# Patient Record
Sex: MALE | Race: WHITE | NOT HISPANIC OR LATINO | Employment: OTHER | URBAN - METROPOLITAN AREA
[De-identification: names, ages, dates, MRNs, and addresses within clinical notes are randomized per-mention and may not be internally consistent; named-entity substitution may affect disease eponyms.]

---

## 2018-08-24 ENCOUNTER — HOSPITAL ENCOUNTER (OUTPATIENT)
Dept: RADIOLOGY | Facility: MEDICAL CENTER | Age: 58
End: 2018-08-24
Attending: FAMILY MEDICINE
Payer: COMMERCIAL

## 2018-08-24 DIAGNOSIS — R91.8 ABNORMAL FINDINGS ON DIAGNOSTIC IMAGING OF LUNG: ICD-10-CM

## 2018-08-24 PROCEDURE — 71046 X-RAY EXAM CHEST 2 VIEWS: CPT

## 2018-09-14 ENCOUNTER — OFFICE VISIT (OUTPATIENT)
Dept: OTHER | Facility: IMAGING CENTER | Age: 58
End: 2018-09-14
Payer: COMMERCIAL

## 2018-09-14 VITALS
HEIGHT: 69 IN | TEMPERATURE: 98.2 F | OXYGEN SATURATION: 95 % | DIASTOLIC BLOOD PRESSURE: 80 MMHG | BODY MASS INDEX: 25.03 KG/M2 | HEART RATE: 67 BPM | RESPIRATION RATE: 14 BRPM | WEIGHT: 169 LBS | SYSTOLIC BLOOD PRESSURE: 148 MMHG

## 2018-09-14 DIAGNOSIS — M62.830 BACK MUSCLE SPASM: ICD-10-CM

## 2018-09-14 DIAGNOSIS — R93.89 ABNORMAL CXR: ICD-10-CM

## 2018-09-14 DIAGNOSIS — R09.89 HYPERINFLATION OF LUNGS: ICD-10-CM

## 2018-09-14 DIAGNOSIS — K44.9 HIATAL HERNIA: ICD-10-CM

## 2018-09-14 DIAGNOSIS — Z80.8 FAMILY HISTORY OF MELANOMA: ICD-10-CM

## 2018-09-14 DIAGNOSIS — R03.0 WHITE COAT SYNDROME WITHOUT DIAGNOSIS OF HYPERTENSION: ICD-10-CM

## 2018-09-14 DIAGNOSIS — K21.9 GASTROESOPHAGEAL REFLUX DISEASE WITHOUT ESOPHAGITIS: ICD-10-CM

## 2018-09-14 DIAGNOSIS — Z82.49 FAMILY HISTORY OF CARDIOVASCULAR DISEASE: ICD-10-CM

## 2018-09-14 PROCEDURE — 99204 OFFICE O/P NEW MOD 45 MIN: CPT | Performed by: FAMILY MEDICINE

## 2018-09-14 RX ORDER — CYCLOBENZAPRINE HCL 5 MG
5-10 TABLET ORAL 3 TIMES DAILY PRN
COMMUNITY
End: 2018-12-12 | Stop reason: SDUPTHER

## 2018-09-14 RX ORDER — OMEPRAZOLE 20 MG/1
CAPSULE, DELAYED RELEASE ORAL
COMMUNITY
Start: 2018-09-02 | End: 2018-11-01 | Stop reason: SDUPTHER

## 2018-09-14 ASSESSMENT — PAIN SCALES - GENERAL: PAINLEVEL: NO PAIN

## 2018-09-14 ASSESSMENT — PATIENT HEALTH QUESTIONNAIRE - PHQ9: CLINICAL INTERPRETATION OF PHQ2 SCORE: 0

## 2018-09-14 NOTE — PROGRESS NOTES
Chief Complaint   Patient presents with   • Gastrophageal Reflux   • Referral Needed     gi, last egd 2008       HPI:  58 y.o. male new patient here to review medical issues and establish care.   Previously with a primary care physician in [place].  [place].  He is planning on going on a trip next year to [facility], will need form signed.      GERD/hiatal hernia- had endoscopy last 2008.  He has been otherwise stable on omeprazole 20 mg daily which she has tolerated well.  He did discuss with his previous primary care physician about possibly having another endoscopy completed.  He initially developed symptoms in his 40s.  He did try to go off medication therapy but about 2 days later had symptoms at night.  He has been on the omeprazole for many years.  Denies being overweight in the past.  Has not had a hiatal hernia repair.    Hyperinflation of lungs-patient had a prior chest x-ray which noted some mild hyperinflation of the lungs previously and had a recent repeat chest x-ray done which showed similar findings.  He had discussed possibly having a pulmonary function test completed.  He denies any history of smoking or exposure to secondhand smoke.  Denies any significant issues with shortness of breath or chest pains with exercise activities.  Denies any history of asthma.  He does not notice any symptoms. 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    Spoke with a high altitude specialist previously as well.   Last fall was at the [facility] camp and had pulmonary issues coming back. Post [facility] trip when he was sick was put on prednisone, albuterol and antibiotics. Had CXR done, was done at [facility]. [facility]. No follow up otherwise.   He had a follow up CXR recently with his PCP which again noted mild hyperinflammation.   No SOB and is physically active.    Exposure to many things due to work history.  Nonsmoker.  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 Last 10 years was in investigations. 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 Had more muscle mass at that time.     He is planning on going on a trip to [place] [facility] next year and will be going to an elevation of 19,000 feet.  He will need medical clearance for this trip.   He discussed possibly completing an exercise treadmill test as well.  However he denies any significant chest pains or shortness of breath with activities.  He exercises 7 days a week currently.    Whitecoat hypertension-states that his blood pressure spikes when in the office.  He does have a log of his blood pressures which he monitors at home.  They have been mostly in the 120-130s/ 70s. Elevated BP in office.     Back muscle spasms -may have occasional back spasms possibly about once a year for which he uses Flexeril.  States that he was told that Flexeril would not be good to use in high altitudes.    States he trained for a trip and when he got home his vitamin B12 was in the low normal range. Taking liquid B 12 supplements. Better energy with vitamin B 12 supplement.    Hx of phlebectomy- bilateral lower leg veins. No swelling or pain.   Right side- last May was skiing and ruptured his plantaris tendon and was advised he did not need to have it repaired. No issues. Has been stable.   Family hx of melanoma- mother. Has seen dermatology in past last about 2 years ago.   Family history of cardiovascular disease.       Health Maintenance  Immunizations: Tdap -August 9, 2018- [facility] - [place].   Colonoscopy: age 51 - repeat in 10 years. Endoscopy- last in 2008, consider repeat in near future.   PSA: consider future lab.     Lifestyle:  Diet: healthy, puts honey in his coffee. Eats some pasta. Has been drinking more water now.   Supplements: Vitamin B12  Exercise: 7 days a week of exercise.  "  Activities: skis, climbs  Stress: \"fairly stress free currently\"  Social Support:  to [place] over 25 years, close friends  Work: retired, law enforcement      Review of Systems   Constitutional: Negative for fever, chills and malaise/fatigue.   HENT: Negative for congestion.    Eyes: Negative for pain or vision changes.   Respiratory: Negative for cough and shortness of breath.    Cardiovascular: Negative for leg swelling. No chest pain.   Gastrointestinal: Negative for nausea, vomiting, abdominal pain and diarrhea.   Genitourinary: Negative for dysuria and hematuria.   Skin: Negative for rash.   Neurological: Negative for dizziness, focal weakness and headaches.   Endo/Heme/Allergies: Does not bruise/bleed easily.   Psychiatric/Behavioral: Negative for depression.  The patient is not nervous/anxious.          Current Outpatient Prescriptions:   •  omeprazole (PRILOSEC) 20 MG delayed-release capsule, , Disp: , Rfl:   •  cyclobenzaprine (FLEXERIL) 5 MG tablet, Take 5-10 mg by mouth 3 times a day as needed., Disp: , Rfl:     No Known Allergies    Past Medical History:   Diagnosis Date   • Back spasm     occurs about once a year, flexeril as needed.    • GERD (gastroesophageal reflux disease)    • Hiatal hernia     endoscopy   • Rupture of right plantaris tendon        Past Surgical History:   Procedure Laterality Date   • PHLEBECTOMY Bilateral 2009   • OTHER  2006    L5-S1 surgery for herniated disc   • INGUINAL HERNIA REPAIR Right 2004   • SHOULDER ARTHROSCOPY Right 1994       Family History   Problem Relation Age of Onset   • Cancer Mother         Melanoma 40s   • Other Father 55        primary amyloidosis- not familial type   • Cancer Maternal Grandmother         colon cancer- around 80s   • Heart Attack Maternal Grandfather         around 60s   • ADD / ADHD Maternal Grandfather    • Diabetes Paternal Grandmother         lives to 80s       Social History     Social History   • Marital status: Unknown     " "Spouse name: N/A   • Number of children: N/A   • Years of education: N/A     Occupational History   • Not on file.     Social History Main Topics   • Smoking status: Never Smoker   • Smokeless tobacco: Never Used   • Alcohol use 1.2 oz/week     1 Glasses of wine, 1 Cans of beer per week   • Drug use: No   • Sexual activity: Yes     Partners: Female     Other Topics Concern   • Not on file     Social History Narrative   • No narrative on file         PHYSICAL EXAM:  /80   Pulse 67   Temp 36.8 °C (98.2 °F)   Resp 14   Ht 1.753 m (5' 9\")   Wt 76.7 kg (169 lb)   SpO2 95%   BMI 24.96 kg/m²   Constitutional: He appears well-developed and well-nourished. He appears not diaphoretic. No distress.   HENT: Right Ear: External ear normal. Left Ear: External ear normal. Tympanic membranes clear and intact.   Nose: Nose normal.   Mouth/Throat: Oropharynx is clear and moist. No oropharyngeal exudate.     Eyes: Conjunctivae and extraocular motions are normal. Pupils are equal, round, and reactive to light. No scleral icterus.   Neck: Normal range of motion. Neck supple. No thyromegaly present.   Cardiovascular: Normal rate, regular rhythm, normal heart sounds and intact distal pulses.  Exam reveals no gallop and no friction rub.  No murmur heard. No carotid bruits.   Pulmonary/Chest: minimally prolonged expiration throughout. No respiratory distress. No wheezes. No rales.   Abdominal: Soft. Bowel sounds are normal. He exhibits no distension and no mass. No tenderness. No rebound and no guarding.   Lymphadenopathy:  He has no cervical adenopathy. No inguinal adenopathy.   Neurological:He is alert. He has normal reflexes. No cranial nerve deficit. He exhibits normal muscle tone.   Skin: Skin is warm and dry. No rash noted. He is not diaphoretic. No erythema.   Psychiatric: He has a normal mood and affect. His behavior is normal.   Musculoskeletal: He exhibits no edema. Difficult to illicit achilles reflexes, otherwise 2+ " DTR throughout. Normal strength throughout.         Narrative       8/24/2018 8:44 AM    HISTORY/REASON FOR EXAM:  Hyperinflation      TECHNIQUE/EXAM DESCRIPTION AND NUMBER OF VIEWS:  Two views of the chest.    COMPARISON:  None available.    FINDINGS:      The mediastinal and cardiac silhouette is unremarkable.    The pulmonary vascularity is within normal limits.    The lung parenchyma is clear. Lungs are mildly hyperinflated.    There is no significant pleural effusion.    There is no visible pneumothorax.    There are no acute bony abnormalities.   Impression       1.  Lungs are mildly hyperinflated. Exam is otherwise unremarkable.     Last labs:see media.  8/24/18 CMP- glucose 99, Lipid- total chol 212, , HDL 52, , HgbA1c 6.0, vitamin B 12 level -902    EKG: see media, Sinus, borderline 1st degree block, HR 51      ASSESSMENT/PLAN:    This is a 58 y.o. Male new patient.     1. Gastroesophageal reflux disease without esophagitis- last EGD 2008, stable. Long term history of symptoms.   -Continue omeprazole 20 mg and modified diet. Referral to GI for possible repeat EGD.  REFERRAL TO GASTROENTEROLOGY   2. Hiatal hernia - stable. As above.  REFERRAL TO GASTROENTEROLOGY   3. Abnormal CXR- as below.   PULMONARY FUNCTION TESTS Test requested: Complete Pulmonary Function Test  CARDIAC STRESS TEST TREADMILL ONLY   4. Hyperinflation of lungs- noted on CXR. Minimal findings on exam. Obtain PFT.  CARDIAC STRESS TEST TREADMILL ONLY  PULMONARY FUNCTION TESTS Test requested: Complete Pulmonary Function Test   5. White coat syndrome without diagnosis of hypertension- elevated BP in office, record of home BP appears within acceptable range.   -Continue to monitor BP routinely. Recommend compare cuff with office measurement. Recommend low sodium diet, routine exercise. Will continue to monitor.      6. Back muscle spasm- intermittent issues. Stable.   -Flexeril as needed. Consider physical therapy and acupuncture  therapy.     7. Family history of melanoma  -Recommend routine dermatology follow up for skin check.   REFERRAL TO DERMATOLOGY   8. Family history of cardiovascular disease- recommend goal LDL < 130 at this time.   -Recommend low cholesterol, high fiber diet. Monitor labs.   CARDIAC STRESS TEST TREADMILL ONLY     Return in about 1 month (around 10/14/2018). Follow up after tests are complete.     This medical record contains text that has been entered with the assistance of computer voice recognition and dictation software.  Therefore, it may contain unintended errors in text, spelling, punctuation, or grammar.

## 2018-09-17 PROBLEM — M62.830 BACK MUSCLE SPASM: Status: ACTIVE | Noted: 2018-09-17

## 2018-09-17 PROBLEM — R03.0 WHITE COAT SYNDROME WITHOUT DIAGNOSIS OF HYPERTENSION: Status: ACTIVE | Noted: 2018-09-17

## 2018-09-20 ENCOUNTER — NON-PROVIDER VISIT (OUTPATIENT)
Dept: CARDIOLOGY | Facility: MEDICAL CENTER | Age: 58
End: 2018-09-20
Attending: FAMILY MEDICINE
Payer: COMMERCIAL

## 2018-09-20 VITALS
BODY MASS INDEX: 25.03 KG/M2 | OXYGEN SATURATION: 98 % | HEIGHT: 69 IN | DIASTOLIC BLOOD PRESSURE: 83 MMHG | SYSTOLIC BLOOD PRESSURE: 153 MMHG | HEART RATE: 72 BPM | WEIGHT: 169 LBS

## 2018-09-20 DIAGNOSIS — Z82.49 FAMILY HISTORY OF ISCHEMIC HEART DISEASE: ICD-10-CM

## 2018-09-20 LAB — TREADMILL STRESS: NORMAL

## 2018-09-20 PROCEDURE — 93015 CV STRESS TEST SUPVJ I&R: CPT | Performed by: INTERNAL MEDICINE

## 2018-09-21 ENCOUNTER — HOSPITAL ENCOUNTER (OUTPATIENT)
Dept: PULMONOLOGY | Facility: MEDICAL CENTER | Age: 58
End: 2018-09-21
Attending: FAMILY MEDICINE
Payer: COMMERCIAL

## 2018-09-21 DIAGNOSIS — R09.89 HYPERINFLATION OF LUNGS: ICD-10-CM

## 2018-09-21 DIAGNOSIS — R93.89 ABNORMAL CXR: ICD-10-CM

## 2018-09-21 PROCEDURE — 94060 EVALUATION OF WHEEZING: CPT

## 2018-09-21 PROCEDURE — 94060 EVALUATION OF WHEEZING: CPT | Mod: 26 | Performed by: INTERNAL MEDICINE

## 2018-09-21 PROCEDURE — 94726 PLETHYSMOGRAPHY LUNG VOLUMES: CPT | Mod: 26 | Performed by: INTERNAL MEDICINE

## 2018-09-21 PROCEDURE — 94726 PLETHYSMOGRAPHY LUNG VOLUMES: CPT

## 2018-09-21 PROCEDURE — 94729 DIFFUSING CAPACITY: CPT | Mod: 26 | Performed by: INTERNAL MEDICINE

## 2018-09-21 PROCEDURE — 94729 DIFFUSING CAPACITY: CPT

## 2018-09-21 ASSESSMENT — PULMONARY FUNCTION TESTS
FEV1/FVC_PERCENT_PREDICTED: 104
FVC: 4.85
FEV1_PREDICTED: 3.57
FEV1/FVC_PERCENT_CHANGE: 67
FVC_PREDICTED: 4.58
FVC_PERCENT_PREDICTED: 105
FEV1_PERCENT_CHANGE: 3
FEV1/FVC_PERCENT_PREDICTED: 78
FEV1_LLN: 2.98
FEV1/FVC_PERCENT_PREDICTED: 104
FEV1_PERCENT_PREDICTED: 110
FEV1/FVC_PERCENT_PREDICTED: 105
FEV1_PERCENT_PREDICTED: 107
FVC_LLN: 3.82
FEV1: 3.84
FEV1/FVC_PERCENT_LLN: 65
FEV1/FVC: 82
FVC: 4.7
FEV1/FVC: 81.44
FEV1/FVC_PREDICTED: 78
FEV1: 3.95
FEV1/FVC_PERCENT_CHANGE: 0
FEV1/FVC_PERCENT_PREDICTED: 105
FEV1_LLN: 2.98
FEV1/FVC: 81
FEV1/FVC: 82
FVC_PERCENT_PREDICTED: 102
FEV1/FVC_PERCENT_LLN: 65
FVC_LLN: 3.82
FEV1_PERCENT_CHANGE: 2

## 2018-09-28 NOTE — PROCEDURES
PULMONARY FUNCTION TEST INTERPRETATION    DATE OF STUDY:  09/21/2018    REQUESTING PROVIDER:  Ermelinda Cotter MD    REASON FOR REQUEST:  Abnormal x-ray.    FINDINGS:  1.  Acceptable and reproducible.  2.  FEV1 is 3.84 liters (107%), FVC is 4.7 liters (102%), ratio 82%.  3.  Flow volume loops normal appearing.  4.  TLC is 7.58 liters (112%).  5.  DLCO corrected for hemoglobin of 26.18 mL/min/mmHg (104%).    IMPRESSION:  Normal spirometry with no response to bronchodilator.  Mild air   trapping, but no hyperinflation.  Gas transfer is normal.  This could be   related to underlying reactive airway disease, but otherwise relatively close   to normal pulmonary function testing.       ____________________________________     MD GUCCI Iglesias / NTS    DD:  09/27/2018 15:55:14  DT:  09/27/2018 16:13:55    D#:  [id number]  Job#:  [id number]    cc: ERMELINDA COTTER MD

## 2018-10-02 ENCOUNTER — PATIENT MESSAGE (OUTPATIENT)
Dept: OTHER | Facility: IMAGING CENTER | Age: 58
End: 2018-10-02

## 2018-10-05 ENCOUNTER — TELEPHONE (OUTPATIENT)
Dept: OTHER | Facility: IMAGING CENTER | Age: 58
End: 2018-10-05

## 2018-10-05 ENCOUNTER — OFFICE VISIT (OUTPATIENT)
Dept: OTHER | Facility: IMAGING CENTER | Age: 58
End: 2018-10-05
Payer: COMMERCIAL

## 2018-10-05 VITALS
OXYGEN SATURATION: 95 % | SYSTOLIC BLOOD PRESSURE: 150 MMHG | TEMPERATURE: 97.6 F | HEIGHT: 69 IN | DIASTOLIC BLOOD PRESSURE: 78 MMHG | BODY MASS INDEX: 25.03 KG/M2 | HEART RATE: 70 BPM | WEIGHT: 169 LBS | RESPIRATION RATE: 14 BRPM

## 2018-10-05 DIAGNOSIS — Z23 NEED FOR SHINGLES VACCINE: ICD-10-CM

## 2018-10-05 DIAGNOSIS — K21.9 GASTROESOPHAGEAL REFLUX DISEASE WITHOUT ESOPHAGITIS: ICD-10-CM

## 2018-10-05 DIAGNOSIS — R09.89 HYPERINFLATION OF LUNGS: ICD-10-CM

## 2018-10-05 DIAGNOSIS — Z82.49 FAMILY HISTORY OF CARDIOVASCULAR DISEASE: ICD-10-CM

## 2018-10-05 DIAGNOSIS — R03.0 WHITE COAT SYNDROME WITHOUT DIAGNOSIS OF HYPERTENSION: ICD-10-CM

## 2018-10-05 PROCEDURE — 99213 OFFICE O/P EST LOW 20 MIN: CPT | Mod: 25 | Performed by: FAMILY MEDICINE

## 2018-10-05 PROCEDURE — 90471 IMMUNIZATION ADMIN: CPT | Performed by: FAMILY MEDICINE

## 2018-10-05 PROCEDURE — 90750 HZV VACC RECOMBINANT IM: CPT | Performed by: FAMILY MEDICINE

## 2018-10-05 NOTE — PROGRESS NOTES
SUBJECTIVE:        Chief Complaint   Patient presents with   • Immunizations     shingles   • Results     pulmonary function test results review       HPI:     Mauricio Mac is a 58 y.o. male here for review of PFT results and for shingles vaccine.   He is active and up at 4 am for his work out.   States when he went on his prior trip last year to [facility], [place] was dia and the trail has animal droppings as well. People develop the [place] cough as they are going up in altitude. States about 3-4 days into his trek he had GI issues and took immodium at a high dose. By the end of the trip his stomach was fine but had lost some weight. He was on diamox as well.   After coming home, his cough had continued 1.5 weeks later, thus was recommended to be seen at urgent care. Where he was xrays and prescribed and inhaler, prednisone, and antibiotic. He improved, but chest xray note mild hyperinflation and thus had repeat recently. He denies having any issues with activities. Denies chest pain, shortness of breath or wheezing. He was an avid  in the past, last went in 2009. Prior history of URI about once a year without any significant major issues.     GERD- GI- saw, reviewed options of therapy. Omeprazole 20 mg daily.   Discussed possible scope last 2006.    White coat hypertension- monitors BP at home, levels are normal. Tends to be elevated in the office.   Family history of CVD- had recent stress test.       ROS:  Denies any recent fevers or chills. No nausea or vomiting. No diarrhea. No chest pains or shortness of breath. No lower extremity edema.    Current Outpatient Prescriptions on File Prior to Visit   Medication Sig Dispense Refill   • omeprazole (PRILOSEC) 20 MG delayed-release capsule      • cyclobenzaprine (FLEXERIL) 5 MG tablet Take 5-10 mg by mouth 3 times a day as needed.       No current facility-administered medications on file prior to visit.        No Known  "Allergies    Patient Active Problem List    Diagnosis Date Noted   • White coat syndrome without diagnosis of hypertension 09/17/2018   • Back muscle spasm 09/17/2018   • Abnormal CXR 09/14/2018   • Hyperinflation of lungs 09/14/2018   • Gastroesophageal reflux disease without esophagitis 09/14/2018   • Hiatal hernia 09/14/2018   • Family history of cardiovascular disease 09/14/2018   • Family history of melanoma 09/14/2018       Past Medical History:   Diagnosis Date   • Back spasm     occurs about once a year, flexeril as needed.    • GERD (gastroesophageal reflux disease)    • Hiatal hernia     endoscopy   • Rupture of right plantaris tendon        OBJECTIVE:   /78   Pulse 70   Temp 36.4 °C (97.6 °F)   Resp 14   Ht 1.753 m (5' 9\")   Wt 76.7 kg (169 lb)   SpO2 95%   BMI 24.96 kg/m²   General: Well-developed well-nourished male, no acute distress  Neck: supple, no lymphadenopathy- cervical or supraclavicular, no thyromegaly  Cardiovascular: regular rate and rhythm, no murmurs, gallops, rubs  Lungs: clear to auscultation bilaterally, no wheezes, crackles, or rhonchi  Abdomen: +bowel sounds, soft, nontender, nondistended, no rebound, no guarding, no hepatosplenomegaly  Extremities: no cyanosis, clubbing, edema  Skin: Warm and dry  Psych: appropriate mood and affect    9/21/18  FINDINGS:  1.  Acceptable and reproducible.  2.  FEV1 is 3.84 liters (107%), FVC is 4.7 liters (102%), ratio 82%.  3.  Flow volume loops normal appearing.  4.  TLC is 7.58 liters (112%).  5.  DLCO corrected for hemoglobin of 26.18 mL/min/mmHg (104%).     IMPRESSION:  Normal spirometry with no response to bronchodilator.  Mild air   trapping, but no hyperinflation.  Gas transfer is normal.  This could be   related to underlying reactive airway disease, but otherwise relatively close   to normal pulmonary function testing.    Narrative     Indications for test: Family history of ischemic heart disease     Resting data:     Heart " rate: 72 bpm     BP: 153/83 mmHg  Baseline EKG: Sinus Rhythm     Procedure:  Patient exercised on a Jerod Protocol.    Patient exercised for 12 minutes, 53 seconds, and 14.8 METs.  100% of MPHR: 163  90% of MPHR: 147  85% of MPHR: 139  Peak Heart Rate Achieved: 146  90 % of MPHR.  Maximum /70 mmHg  Functional aerobic impairment: ( Off scale ) Active     Test was terminated due to: Fatigue     Observations/Comments: Patient tolerated test well. He denied chest pain or discomfort.  Vital signs stable, oxygen saturation 97%. No ectopy noted.        ASSESSMENT/PLAN:    58 y.o.male with GERD/hiatal hernia and white coat hypertension.       1. Hyperinflation of lungs- mild findings on cxr. Patient otherwise asymptomatic very active individual.    -Reviewed findings with patient and possible development of future symptoms. Recommend continue to monitor for any new or persistent respiratory symptoms. Otherwise currently without significant concern for participation in future trip involving high altitude. Paperwork signed and given to patient for clearance of future activity.     2. Gastroesophageal reflux disease without esophagitis - stable, continue current medication. Follow up GI routinely.     3. White coat syndrome without diagnosis of hypertension - stable. Continue to monitor BP.     4. Family history of cardiovascular disease     5. Need for shingles vaccine  Shingles Vaccine (Shingrix)       Return if symptoms worsen or fail to improve.  Otherwise recommend routine annual exam.       This medical record contains text that has been entered with the assistance of computer voice recognition and dictation software.  Therefore, it may contain unintended errors in text, spelling, punctuation, or grammar.

## 2018-10-05 NOTE — LETTER
[facility]  Ermelinda Cotter M.D.  [address] [place] [address]  Fax: [phone]   Authorization for Release/Disclosure of   Protected Health Information   Name: BETSEY WIGGINS : 1960 SSN: [id number]   Address: [address] [place] [address]  [place] [address] Phone:    [phone] (home)    I authorize the entity listed below to release/disclose the PHI below to:   [facility]/Ermelinda Cotter M.D. and Ermelinda Cotter M.D.   Provider or Entity Name:     Address   City, State, Zip   Phone:      Fax:     Reason for request: continuity of care   Information to be released:    [x] LAST COLONOSCOPY,  including any PATH REPORT and follow-up  [  ] LAST FIT/COLOGUARD RESULT [  ] LAST DEXA  [  ] LAST MAMMOGRAM  [  ] LAST PAP  [  ] LAST LABS [  ] RETINA EXAM REPORT  [  ] IMMUNIZATION RECORDS  [  ] Release all info      [  ] Check here and initial the line next to each item to release ALL health information INCLUDING  _____ Care and treatment for drug and / or alcohol abuse  _____ HIV testing, infection status, or AIDS  _____ Genetic Testing    DATES OF SERVICE OR TIME PERIOD TO BE DISCLOSED: _____________  I understand and acknowledge that:  * This Authorization may be revoked at any time by you in writing, except if your health information has already been used or disclosed.  * Your health information that will be used or disclosed as a result of you signing this authorization could be re-disclosed by the recipient. If this occurs, your re-disclosed health information may no longer be protected by State or Federal laws.  * You may refuse to sign this Authorization. Your refusal will not affect your ability to obtain treatment.  * This Authorization becomes effective upon signing and will  on (date) __________.      If no date is indicated, this Authorization will  one (1) year from the signature date.    Name: Betsey Wiggins    Signature:   Date:     10/5/2018       PLEASE FAX REQUESTED  RECORDS BACK TO: [phone]

## 2018-10-05 NOTE — TELEPHONE ENCOUNTER
Called [employer] to check coverage of shingrix vaccine. Representative stated pt had 100% coverage for shingrix vaccine. Reference # [id number]

## 2018-12-12 ENCOUNTER — NON-PROVIDER VISIT (OUTPATIENT)
Dept: OTHER | Facility: IMAGING CENTER | Age: 58
End: 2018-12-12
Payer: COMMERCIAL

## 2018-12-12 DIAGNOSIS — Z23 NEED FOR SHINGLES VACCINE: ICD-10-CM

## 2018-12-12 PROCEDURE — 90750 HZV VACC RECOMBINANT IM: CPT | Performed by: FAMILY MEDICINE

## 2018-12-12 PROCEDURE — 90471 IMMUNIZATION ADMIN: CPT | Performed by: FAMILY MEDICINE

## 2018-12-12 NOTE — PROGRESS NOTES
"Mauricio Mac is a 58 y.o. male here for a non-provider visit for: Shingrix #2      Reason for immunization: continue or complete series started at the office  Immunization records indicate need for vaccine: Yes, confirmed with Epic  Minimum interval has been met for this vaccine: Yes  ABN completed: Not Indicated    Order and dose verified by: Nancy JORDAN Dated  02/12/18 was given to patient: Yes  All IAC Questionnaire questions were answered \"No.\"    Patient tolerated injection and no adverse effects were observed or reported: Yes    Pt scheduled for next dose in series: Not Indicated    "

## 2019-03-11 ENCOUNTER — APPOINTMENT (OUTPATIENT)
Dept: OTHER | Facility: IMAGING CENTER | Age: 59
End: 2019-03-11

## 2019-04-25 ENCOUNTER — TELEPHONE (OUTPATIENT)
Dept: MEDICAL GROUP | Facility: IMAGING CENTER | Age: 59
End: 2019-04-25

## 2019-04-25 NOTE — TELEPHONE ENCOUNTER
Ann from Dr. Dyson office calling to state patient would like his Omeprazole prescription changed to 10 mg. She states patient mentioned it to Dr. Barnard and wanted to let us know.

## 2019-06-29 ENCOUNTER — HOSPITAL ENCOUNTER (OUTPATIENT)
Facility: MEDICAL CENTER | Age: 59
End: 2019-07-02
Attending: EMERGENCY MEDICINE | Admitting: HOSPITALIST
Payer: COMMERCIAL

## 2019-06-29 DIAGNOSIS — M33.90 DERMATOMYOSITIS (HCC): ICD-10-CM

## 2019-06-29 DIAGNOSIS — R13.10 DYSPHAGIA, UNSPECIFIED TYPE: ICD-10-CM

## 2019-06-29 DIAGNOSIS — E86.0 DEHYDRATION: ICD-10-CM

## 2019-06-29 LAB
ALBUMIN SERPL BCP-MCNC: 4.4 G/DL (ref 3.2–4.9)
ALBUMIN/GLOB SERPL: 1.6 G/DL
ALP SERPL-CCNC: 63 U/L (ref 30–99)
ALT SERPL-CCNC: 32 U/L (ref 2–50)
ANION GAP SERPL CALC-SCNC: 10 MMOL/L (ref 0–11.9)
AST SERPL-CCNC: 53 U/L (ref 12–45)
BASOPHILS # BLD AUTO: 0.1 % (ref 0–1.8)
BASOPHILS # BLD: 0.02 K/UL (ref 0–0.12)
BILIRUB SERPL-MCNC: 0.5 MG/DL (ref 0.1–1.5)
BUN SERPL-MCNC: 24 MG/DL (ref 8–22)
CALCIUM SERPL-MCNC: 9.9 MG/DL (ref 8.5–10.5)
CHLORIDE SERPL-SCNC: 103 MMOL/L (ref 96–112)
CO2 SERPL-SCNC: 28 MMOL/L (ref 20–33)
CREAT SERPL-MCNC: 1.09 MG/DL (ref 0.5–1.4)
EOSINOPHIL # BLD AUTO: 0.02 K/UL (ref 0–0.51)
EOSINOPHIL NFR BLD: 0.1 % (ref 0–6.9)
ERYTHROCYTE [DISTWIDTH] IN BLOOD BY AUTOMATED COUNT: 42.4 FL (ref 35.9–50)
GLOBULIN SER CALC-MCNC: 2.7 G/DL (ref 1.9–3.5)
GLUCOSE SERPL-MCNC: 122 MG/DL (ref 65–99)
HCT VFR BLD AUTO: 40.4 % (ref 42–52)
HGB BLD-MCNC: 13.2 G/DL (ref 14–18)
IMM GRANULOCYTES # BLD AUTO: 0.08 K/UL (ref 0–0.11)
IMM GRANULOCYTES NFR BLD AUTO: 0.6 % (ref 0–0.9)
LYMPHOCYTES # BLD AUTO: 2.21 K/UL (ref 1–4.8)
LYMPHOCYTES NFR BLD: 15.8 % (ref 22–41)
MCH RBC QN AUTO: 27.2 PG (ref 27–33)
MCHC RBC AUTO-ENTMCNC: 32.7 G/DL (ref 33.7–35.3)
MCV RBC AUTO: 83.3 FL (ref 81.4–97.8)
MONOCYTES # BLD AUTO: 1.74 K/UL (ref 0–0.85)
MONOCYTES NFR BLD AUTO: 12.4 % (ref 0–13.4)
NEUTROPHILS # BLD AUTO: 9.92 K/UL (ref 1.82–7.42)
NEUTROPHILS NFR BLD: 71 % (ref 44–72)
NRBC # BLD AUTO: 0 K/UL
NRBC BLD-RTO: 0 /100 WBC
PLATELET # BLD AUTO: 345 K/UL (ref 164–446)
PMV BLD AUTO: 9.7 FL (ref 9–12.9)
POTASSIUM SERPL-SCNC: 4.1 MMOL/L (ref 3.6–5.5)
PROT SERPL-MCNC: 7.1 G/DL (ref 6–8.2)
RBC # BLD AUTO: 4.85 M/UL (ref 4.7–6.1)
SODIUM SERPL-SCNC: 141 MMOL/L (ref 135–145)
WBC # BLD AUTO: 14 K/UL (ref 4.8–10.8)

## 2019-06-29 PROCEDURE — 80053 COMPREHEN METABOLIC PANEL: CPT

## 2019-06-29 PROCEDURE — 99285 EMERGENCY DEPT VISIT HI MDM: CPT

## 2019-06-29 PROCEDURE — 700105 HCHG RX REV CODE 258: Performed by: EMERGENCY MEDICINE

## 2019-06-29 PROCEDURE — 36415 COLL VENOUS BLD VENIPUNCTURE: CPT

## 2019-06-29 PROCEDURE — 85025 COMPLETE CBC W/AUTO DIFF WBC: CPT

## 2019-06-29 RX ORDER — SODIUM CHLORIDE 9 MG/ML
INJECTION, SOLUTION INTRAVENOUS CONTINUOUS
Status: DISCONTINUED | OUTPATIENT
Start: 2019-06-29 | End: 2019-06-30

## 2019-06-29 RX ORDER — PREDNISONE 20 MG/1
40 TABLET ORAL DAILY
Status: ON HOLD | COMMUNITY
End: 2019-07-02

## 2019-06-29 RX ORDER — CHOLECALCIFEROL (VITAMIN D3) 125 MCG
5000 CAPSULE ORAL DAILY
COMMUNITY
End: 2021-11-29

## 2019-06-29 RX ORDER — RANITIDINE 150 MG/1
150 TABLET ORAL DAILY
COMMUNITY
End: 2021-11-29

## 2019-06-29 RX ADMIN — SODIUM CHLORIDE: 9 INJECTION, SOLUTION INTRAVENOUS at 22:45

## 2019-06-30 ENCOUNTER — APPOINTMENT (OUTPATIENT)
Dept: RADIOLOGY | Facility: MEDICAL CENTER | Age: 59
End: 2019-06-30
Attending: HOSPITALIST
Payer: COMMERCIAL

## 2019-06-30 LAB
MAGNESIUM SERPL-MCNC: 2.1 MG/DL (ref 1.5–2.5)
TSH SERPL DL<=0.005 MIU/L-ACNC: 0.97 UIU/ML (ref 0.38–5.33)

## 2019-06-30 PROCEDURE — 700105 HCHG RX REV CODE 258: Performed by: EMERGENCY MEDICINE

## 2019-06-30 PROCEDURE — A9270 NON-COVERED ITEM OR SERVICE: HCPCS | Performed by: INTERNAL MEDICINE

## 2019-06-30 PROCEDURE — 83735 ASSAY OF MAGNESIUM: CPT

## 2019-06-30 PROCEDURE — 36415 COLL VENOUS BLD VENIPUNCTURE: CPT

## 2019-06-30 PROCEDURE — 74220 X-RAY XM ESOPHAGUS 1CNTRST: CPT

## 2019-06-30 PROCEDURE — 700111 HCHG RX REV CODE 636 W/ 250 OVERRIDE (IP): Performed by: INTERNAL MEDICINE

## 2019-06-30 PROCEDURE — 700102 HCHG RX REV CODE 250 W/ 637 OVERRIDE(OP): Performed by: INTERNAL MEDICINE

## 2019-06-30 PROCEDURE — G0378 HOSPITAL OBSERVATION PER HR: HCPCS

## 2019-06-30 PROCEDURE — 92610 EVALUATE SWALLOWING FUNCTION: CPT

## 2019-06-30 PROCEDURE — 700102 HCHG RX REV CODE 250 W/ 637 OVERRIDE(OP): Performed by: FAMILY MEDICINE

## 2019-06-30 PROCEDURE — A9270 NON-COVERED ITEM OR SERVICE: HCPCS | Performed by: FAMILY MEDICINE

## 2019-06-30 PROCEDURE — 84443 ASSAY THYROID STIM HORMONE: CPT

## 2019-06-30 RX ORDER — SUCRALFATE ORAL 1 G/10ML
1 SUSPENSION ORAL
Status: DISCONTINUED | OUTPATIENT
Start: 2019-06-30 | End: 2019-07-02 | Stop reason: HOSPADM

## 2019-06-30 RX ORDER — POLYETHYLENE GLYCOL 3350 17 G/17G
1 POWDER, FOR SOLUTION ORAL
Status: DISCONTINUED | OUTPATIENT
Start: 2019-06-30 | End: 2019-07-02 | Stop reason: HOSPADM

## 2019-06-30 RX ORDER — ACETAMINOPHEN 325 MG/1
650 TABLET ORAL EVERY 6 HOURS PRN
Status: DISCONTINUED | OUTPATIENT
Start: 2019-06-30 | End: 2019-07-02 | Stop reason: HOSPADM

## 2019-06-30 RX ORDER — BISACODYL 10 MG
10 SUPPOSITORY, RECTAL RECTAL
Status: DISCONTINUED | OUTPATIENT
Start: 2019-06-30 | End: 2019-07-02 | Stop reason: HOSPADM

## 2019-06-30 RX ORDER — RANITIDINE 15 MG/ML
150 SOLUTION ORAL DAILY
Status: DISCONTINUED | OUTPATIENT
Start: 2019-06-30 | End: 2019-06-30

## 2019-06-30 RX ORDER — PREDNISONE 10 MG/1
40 TABLET ORAL DAILY
Status: DISCONTINUED | OUTPATIENT
Start: 2019-06-30 | End: 2019-06-30

## 2019-06-30 RX ORDER — AMOXICILLIN 250 MG
2 CAPSULE ORAL 2 TIMES DAILY
Status: DISCONTINUED | OUTPATIENT
Start: 2019-06-30 | End: 2019-07-02 | Stop reason: HOSPADM

## 2019-06-30 RX ORDER — PREDNISONE 10 MG/1
40 TABLET ORAL DAILY
Status: DISCONTINUED | OUTPATIENT
Start: 2019-06-30 | End: 2019-07-01

## 2019-06-30 RX ORDER — FAMOTIDINE 20 MG/1
20 TABLET, FILM COATED ORAL DAILY
Status: DISCONTINUED | OUTPATIENT
Start: 2019-06-30 | End: 2019-07-02 | Stop reason: HOSPADM

## 2019-06-30 RX ADMIN — SUCRALFATE 1 G: 1 SUSPENSION ORAL at 17:39

## 2019-06-30 RX ADMIN — FAMOTIDINE 20 MG: 20 TABLET ORAL at 06:10

## 2019-06-30 RX ADMIN — NYSTATIN 500000 UNITS: 100000 SUSPENSION ORAL at 17:39

## 2019-06-30 RX ADMIN — NYSTATIN 500000 UNITS: 100000 SUSPENSION ORAL at 21:17

## 2019-06-30 RX ADMIN — NYSTATIN 500000 UNITS: 100000 SUSPENSION ORAL at 09:02

## 2019-06-30 RX ADMIN — SODIUM CHLORIDE: 9 INJECTION, SOLUTION INTRAVENOUS at 03:46

## 2019-06-30 RX ADMIN — NYSTATIN 500000 UNITS: 100000 SUSPENSION ORAL at 12:41

## 2019-06-30 RX ADMIN — SODIUM CHLORIDE: 9 INJECTION, SOLUTION INTRAVENOUS at 12:42

## 2019-06-30 RX ADMIN — PREDNISONE 40 MG: 10 TABLET ORAL at 06:10

## 2019-06-30 ASSESSMENT — COGNITIVE AND FUNCTIONAL STATUS - GENERAL
DAILY ACTIVITIY SCORE: 24
MOBILITY SCORE: 24
SUGGESTED CMS G CODE MODIFIER DAILY ACTIVITY: CH
SUGGESTED CMS G CODE MODIFIER MOBILITY: CH

## 2019-06-30 ASSESSMENT — COPD QUESTIONNAIRES
DURING THE PAST 4 WEEKS HOW MUCH DID YOU FEEL SHORT OF BREATH: NONE/LITTLE OF THE TIME
HAVE YOU SMOKED AT LEAST 100 CIGARETTES IN YOUR ENTIRE LIFE: NO/DON'T KNOW
IN THE PAST 12 MONTHS DO YOU DO LESS THAN YOU USED TO BECAUSE OF YOUR BREATHING PROBLEMS: DISAGREE/UNSURE
DO YOU EVER COUGH UP ANY MUCUS OR PHLEGM?: NO/ONLY WITH OCCASIONAL COLDS OR INFECTIONS
COPD SCREENING SCORE: 1

## 2019-06-30 ASSESSMENT — LIFESTYLE VARIABLES
EVER_SMOKED: NEVER
ALCOHOL_USE: NO

## 2019-06-30 ASSESSMENT — PATIENT HEALTH QUESTIONNAIRE - PHQ9
1. LITTLE INTEREST OR PLEASURE IN DOING THINGS: NOT AT ALL
2. FEELING DOWN, DEPRESSED, IRRITABLE, OR HOPELESS: NOT AT ALL
SUM OF ALL RESPONSES TO PHQ9 QUESTIONS 1 AND 2: 0

## 2019-06-30 NOTE — PROGRESS NOTES
Discussed case with patient. He was seen by GI, note not in EMR yet, per pt. They are not planning on imaging at this time, but ordered Carafate. We will wait for the official note. Speech therapy to see the patient today. We will discuss further therapy and diagnostic methods after their evaluation. Pt. Voices understanding and agreement with the current plan.

## 2019-06-30 NOTE — CARE PLAN
Problem: Safety  Goal: Will remain free from falls  Outcome: PROGRESSING AS EXPECTED      Problem: Psychosocial Needs:  Goal: Level of anxiety will decrease  Outcome: PROGRESSING AS EXPECTED  Pt has understanding of prescribed regimen while inpatient. Pt anxious to know dx, pt reassured and anxiety level decreased

## 2019-06-30 NOTE — PROGRESS NOTES
Discussed case with radiology Dr. Gooden. There is a focal narrowing of cervical esophagus visible on the radiologic studies. Per radiology very unlikely tumor due to the concentric appearance of the stricture.   Discussed results of the radiology studies with Dr. Martinez GI. He does not think the stricture is a likely finding as pt. had a recent upper GI scope done in 10/18 without any signs of stricture at that time. He will schedule pt. for outpatient follow up with Dr. Kyle.

## 2019-06-30 NOTE — PROGRESS NOTES
· 2 RN skin check complete with Michele SALMERON  · Devices in place PIV  · Skin assessed under devices yes.  · Confirmed pressure ulcers found on n/a.  · New potential pressure ulcers noted on n/a.  · The following interventions are in place - patient is ambulatory with no skin issues except minor rashes due to current diagnosis.

## 2019-06-30 NOTE — ED TRIAGE NOTES
Mauricio Mac  58 y.o.  Chief Complaint   Patient presents with   • Difficulty Swallowing     x 2 weeks     Ambulatory to triage with steady gait for above.    Patient recently diagnosed by PCP Dr. Cleaning with dermatomyositis.    Currently on a high dose Prednisone taper, today is the first day of dose 40 mg PO daily.    Patient has been having persistent dysphagia x 2 weeks. States that he is having difficulty with all liquids, especially water. Also having difficulty swallowing secretions, especially at night.    States that he has lost 5 lbs in the past week due to inability to eat.    Triage process explained to patient, apologized for wait time, and returned to [facility].

## 2019-06-30 NOTE — CARE PLAN
Problem: Safety  Goal: Will remain free from injury  Call light is within reach.     Problem: Infection  Goal: Will remain free from infection  Frequent hand hygiene used in patient care.

## 2019-06-30 NOTE — THERAPY
"Speech Language Therapy Clinical Swallow Evaluation completed.    Functional Status: Pt eager for PO intake. Oral motor examination normal, clear voicing, strong cough, communicates independently at conversational level with 100% intelligibility, follows all commands, AOX4. Pt tolerated PO trials ice chips, thin liquid, and puree (applesauce) without clinical s/o aspiration, but an effortful swallow was noted with slowed laryngeal elevation to palpation. Multiple swallows also noted. The same was noted with soft solids and solid textures, however, pt also reported globus in mid thoracic region with a sensation of \"pressure\" after swallow. Recommend pt initiate a full liquid, thin liquid diet. We will also perform a Modified Barium Swallow to assess pt's pharyngeal swallow with a variety of viscosities/textures and to guide treatment plan.     Recommendations - Diet: Full liquid, thin liquid (lactose intolerant per patient)                          Strategies: 90 degree hip flexion during PO intake/in chair for meals, small bites and sips, slow rate of intake.                           Medication Administration: Crush with applesauce    Plan of Care: Will benefit from Speech Therapy 5 times per week    Post-Acute Therapy: Discharge to home with outpatient or home health for additional skilled therapy services.    See \"Rehab Therapy-Acute\" Patient Summary Report for complete documentation.   "

## 2019-06-30 NOTE — CONSULTS
DATE OF SERVICE:  06/30/2019    GASTROENTEROLOGY CONSULTATION    TIME:  11:38 a.m.    REFERRING PHYSICIAN:  Lillie Martinez MD, [facility] Medicine Team    PRIMARY CARE PHYSICIAN:  Dr. Jaron Cleaning    GASTROENTEROLOGIST:  Ok Kyle MD    REASON FOR CONSULTATION:  Dysphagia.    HISTORY OF PRESENT ILLNESS:  A 58-year-old  male recently diagnosed   with dermatomyositis, presents with oropharyngeal/transfer dysphagia.  The   patient underwent EGD by Dr. Kyle in 10/2018.  He was found to have mild   reflux esophagitis with irregular distal esophageal Z line, but no evidence of   Taylor's esophagus.  Gastric biopsies were negative for H. pylori.  Benign 4   mm fundic gland polyp was noted.  Otherwise, he had been taking omeprazole 20   mg daily with good control of his heartburn and reflux symptoms.  He denied   dysphagia until after onset of his dermatomyositis symptoms.  He has been   training for climb to [employer] and then noted muscle soreness and myalgias   with weakness.  He had initially thought he was over training.  Then, he   developed a rash on his chest, back, and inner thighs as well as around his   face.  He was seen by a dermatologist and had lab testing with elevated   aldolase and was started on prednisone taper for dermatomyositis.  After   initiation of corticosteroids, his rash essentially resolved and his muscle   weakness improved.  A few weeks afterwards, then he developed   oropharyngeal/transfer dysphagia.  He has lost about 12 pounds with poor p.o.   intake and anorexia, but he denies any odynophagia or breakthrough heartburn   symptoms.  He denies prior history of food impaction.  He was switched from   Prilosec to Zantac since he was concerned that he had read after doing a   Google search about case report of dermatomyositis being caused by proton pump   inhibitors.  Otherwise, he denies any abdominal pain, nausea, vomiting,   hematochezia, melena, or jaundice.  He denies further  modifying factors,   associated symptoms, or timing issues with his complaints.    PAST MEDICAL HISTORY:  Dermatomyositis, GERD with hiatal hernias, seasonal   allergies, allergic rhinitis.    PAST SURGICAL HISTORY:  Right shoulder arthroscopic surgery, hiatal hernia   repair, L5-S1 surgery, bilateral stones removed from superficial blood   vessels, phlebectomies.    ALLERGIES:  AMOXICILLIN AND LATEX.    OUTPATIENT MEDICATIONS:  Flexeril.    INPATIENT MEDICATIONS:  Famotidine, prednisone, nystatin, Tylenol p.r.n.,   docusate p.r.n., milk of magnesia p.r.n., MiraLax p.r.n., Eden-Colace b.i.d.    FAMILY HISTORY:  Father  of amyloidosis at age 85.  Mother had melanoma,   essential tremor, had also diagnosed with colorectal cancer at older age.    Uncle had bone cancer with occupational toxicity exposure.    SOCIAL HISTORY:  Lives with wife at home.  He has a PET.  Drinks about 2   alcoholic beverages a week.  Denied tobacco or illicit drug use.  Retired,   previously worked in [employer], did arson investigations, and avid   climber and enthusiast.    REVIEW OF SYSTEMS:  As per HPI, past medical history and past surgical history   sections, otherwise also positive for weight loss, occasional palpitations,   dysphagia, constipation, rash.  Otherwise, greater than 10 systems negative   including constitutional, head, ears, eyes, nose, throat, pulmonary,   cardiovascular, GI, genitorectal, hematological, rheumatological, psychiatric,   neurological, dermatological, oncological, musculoskeletal.    PHYSICAL EXAMINATION:  VITAL SIGNS:  Temperature 97.3, respirations 18, pulse 60s-100, blood pressure   150/75, weight 72.4 kg, BMI of 23.  GENERAL:  Well-developed, well-nourished white male, in no apparent distress,   alert and oriented.  HEENT:  Atraumatic, normocephalic.  Eyes:  Extraocular movements intact,   nonicteric sclerae.  Nose:  No erythema or discharge.  Mouth:  No erythema or   discharge.  No thrush  noted.  Mallampati 2.  NECK:  Supple.  No lymphadenopathy or JVD.  PULMONARY:  Clear to auscultation bilaterally.  CARDIOVASCULAR:  Regular rate and rhythm without murmur, rub, or gallop.  ABDOMEN:  Nondistended, nontender.  Positive bowel sounds.  No appreciable   hepatosplenomegaly, ascites, ecchymosis, or rebound.  DERMATOLOGICAL:  No spider angiomas.  No palmar erythema.  NEUROLOGIC:  Diffuse weakness.  DERMATOLOGIC:  Rash around eyes and hands.    LABORATORY DATA:  WBC 14, hemoglobin 13.2, hematocrit 40.4, MCV 83.3, platelet   count 345.  Glucose 122, BUN 24, AST 53, otherwise normal electrolytes.  GFR   greater than 60 and rest of LFTs normal.    IMPRESSION:  A 58-year-old  male presents with oropharyngeal/transfer   dysphagia, suspected to be due to dermatomyositis with myopathy.  No   indication for EGD with dilatation at this time since he had recent EGD in   10/2018 that did not show any esophageal stricture and the degree of reflux   esophagitis was fairly minor, basically irregular distal esophageal Z line   that was biopsied incidentally and showed no evidence of Taylor's esophagus   plus he had been on chronic proton pump inhibitor therapy since then, so the   likelihood of developing a peptic stricture would be exceedingly rare.  His   symptoms are consistent with myopathy from dermatomyositis and should be   treated as such and he is contraindicated for upper endoscopy due to risk of   respiratory compromise as well as decompensation with moderate sedation and/or   monitored anesthesia care with his uncontrolled dermatomyositis.    PROBLEMS:  1.  Oropharyngeal/transfer dysphagia.  2.  Dermatomyositis with myopathy.  3.  Gastroesophageal reflux disease, well controlled, asymptomatic.  4.  Other specified intestinal malabsorption from prior proton pump inhibitor   therapy changed to Pepcid.  5.  Elevated AST from dermatomyositis.  No evidence of liver disease.  Rest of   LFTs normal.  6.   Leukocytosis.    RECOMMENDATIONS:  1.  Discontinue omeprazole since the patient is worried about it potentially   causing dermatomyositis, which I doubt, but we will check magnesium level   since magnesium deficiency is associated with chronic proton pump inhibitor   uses and can be associated with muscle weakness.  Note to [facility] team to   treat hypomagnesemia if low.  2.  Long-term H2 blocker therapy instead of proton pump inhibitor therapy.    Pepcid is okay, but as an outpatient, would recommend switching to Zantac   instead since it is a  drug with less P450 liver metabolism interaction   and other medication interactions.  3.  No indication for EGD, as there is not likely to be therapeutic benefit   with aforementioned reasons and he has had high risk for respiratory   compromise with sedation and/or anesthesia.  4.  Recommend that [facility] team consult rheumatologist for further   recommendations and treatment of dermatomyositis.  5.  Add Carafate suspension p.o. t.i.d. x14 days.  6.  Note, [facility] team consider checking thyroid studies if these labs have   not been checked recently.  7.  GI consultants signed off.  Follow up with established GI physician, Dr. Ok Kyle, in 4-6 weeks.        Dear Dr. Lillie Molina and [facility] Medicine Team,    Thank you for asking me to evaluate your patient in consultation.  Please   refer to my consult note as per above for details of recommendations.  Please   feel free to call us with any questions.       ____________________________________     MD DEEPA BILLINGS / [place]    DD:  06/30/2019 11:50:02  DT:  06/30/2019 12:39:07    D#:  [id number]  Job#:  [id number]    cc: LILLIE MOLINA MD, Ok Kyle MD, _____ _____

## 2019-06-30 NOTE — PROGRESS NOTES
Paged [facility] family med team per pt request as pt is concerned about treatment and wants to inquire regarding more imaging.

## 2019-06-30 NOTE — H&P
"[facility] FAMILY MEDICINE HISTORY AND PHYSICAL    Date & Time:    6/30/2019   12:32 AM        PATIENT ID  Name:             Mauricio Mac   YOB: 1960  Age:                 58 y.o.  male   MRN:               [id number]      CHIEF COMPLAINT:       \"I can't keep anything down at home.\"     HISTORY OF PRESENT ILLNESS:  Mr. Mauricio Mac is a 57yo male with conditional diagnosis of dermatomyositis with three month history of muscle aches, joint pains, and rashes; now with progressive difficulty swallowing and weight loss of 12 pounds in the last month.     Patient is very detail oriented and provides a rather long and meticulous history, but essentially noted symptoms started appoximately 3 months ago. He had been training for a climb (has been to [place] in the past) and started to notice muscle soreness in his inner thighs and abdomen, which was unusual for him. He initially thought he had trained too hard, but soreness did not go away. He then developed a rash on his back and was referred to dermatology for biopsy and blood testing due to concern for dermatomyositis.  Patient reports initial biopsy was not confirmatory, but did appear to show inflammation. Per his report, his blood levels of creatinine and adolase were initially elevated, but returned to normal after rest; again, he was told perhaps he was over-training. He returned to training, but now noted leg and shoulder pain, as well as pink, circular patches of discoloration on his skin and dry, peeling cuticles on his fingers, which was followed by pink scaling rash around his eyelids. At this time, he was started on a steroid medication, which resolved in improvement in muscle soreness, but not rashes. Steroids were followed by a short course of Mobic, which was discontinued shortly thereafter.     Patient then reports developing rashes on his neck, inside of his arms, and inner thighs, at which time he returned to see his primary care provider " (6/21). He was then given a large dose of steroids in the clinic, followed by a robust steroid taper of 60mg po x5 days, followed by 40mg po x5 days (first dose of 40 on 6/29), etc.     While reports resolving rashes and muscle aches and pains as well as improvement in night sweats with the above high dose steroids, he has noted progressive dysphagia with liquids more difficult than solids and has had episodes of regurgitation. He reports 12 pounds of weight loss in the last month; 5 pound weight loss in last week.     Of note, patient has history of GERD esophagitis; followed by GI provider, Dr. Kyle. He recently had an upper endoscopy in Nov 2018 and his omeprazole was increased from 20mg every other day to 20mg daily. Patient has been doing everything to figure out what is causing his symptoms and read that omeprazole can cause dermatomyositis, at which time patient discontinued this medication (approximately 3 weeks ago) and switched to Zantac 150mg po once daily. Took Zantac for 1.5 weeks, then switched back to omeprazole for 3 days (not clear to reasoning for this), then back on Zantac for the last week. He attempted to call his GI provider to discuss, but had not yet heard back.     He is incredibly concerned. Worries that his liver is not functioning. Worries that he has a new head tremor. Feels as though he needs to see a specialist ASAP for diagnostic clarification.     REVIEW OF SYSTEMS:     Constitutional: Denies fever, chills; ENDORSES 12 pound weight loss on the last month   Cardiovascular:  Denies chest pain, orthopnea, edema, dyspnea on exertion; ENDORSES OCCASIONAL SKIPPED HEART BEAT WITHOUT PALPITATIONS   Respiratory:  Denies cough, wheezing, shortness of breath, hemoptysis.  Gastrointestinal/Hepatic:  Denies abd pain, nausea, vomiting, diarrhea, melena, BRBPR; ENDORSES NEW DIFFICULTY SWALLOWING, REGURGITATION, AND CONSTIPATION .  Genitourinary:  Denies dysuria, frequency, incontinence or changes  "in bladder function.  Musculoskeletal/Rheum: ENDORSES RESOLVING PINK, SCALING RASH AND MUSCLE ACHES AND PAINS   Skin/Breast: RASHES THROUGHOUT AS ABOVE; only with dry peeling cuticles, residual erythema under creases of eyes, and pink spots most notable on index fingers bilaterally   Neurological: Denies focal weakness, numbness, tingling.  Heme/Oncology: Denies easy bruising, easy bleeding.        PAST MEDICAL/ FAMILY/ SOCIAL HISTORY (PFSH):   Past Medical History:   GERD  Hiatal hernia   Seasonal allergic rhinitis     Past Surgical History:  Right shoulder arthroscopy   Hiatal hernia repair  L5-S1 surgery   Bilateral stabphlebectomy (tiny stones removed from superficial blood vessels)    Current Outpatient Medications:  No current facility-administered medications on file prior to encounter.      Current Outpatient Prescriptions on File Prior to Encounter   Medication Sig Dispense Refill   • cyclobenzaprine (FLEXERIL) 5 MG tablet Take 1-2 Tabs by mouth 3 times a day as needed. 30 Tab 0       Current Inpatient Medications:  Current Facility-Administered Medications   Medication Last Dose   • senna-docusate (PERICOLACE or SENOKOT S) 8.6-50 MG per tablet 2 Tab      And   • polyethylene glycol/lytes (MIRALAX) PACKET 1 Packet      And   • magnesium hydroxide (MILK OF MAGNESIA) suspension 30 mL      And   • bisacodyl (DULCOLAX) suppository 10 mg     • enoxaparin (LOVENOX) inj 40 mg     • acetaminophen (TYLENOL) tablet 650 mg     • NS infusion       Current Outpatient Prescriptions   Medication   • raNITidine (ZANTAC) 150 MG Tab   • cyanocobalamin (VITAMIN B-12) 500 MCG Tab   • predniSONE (DELTASONE) 20 MG Tab   • cyclobenzaprine (FLEXERIL) 5 MG tablet       Medication Allergy/Sensitivities:  Allergies   Allergen Reactions   • Amoxicillin    • Latex        Family History:  Father: passed form amyloidosis at age 55  Mom: essential tremor, melanoma  Maternal grandmother: passed from colon cancer  Uncle: \"bone marrow " "cancer\" after toxic occupational exposure     Social History:  Lives at home with wife. Has a pet parrot at home. Drinks 2 alcoholic beverages/week. No tobacco history. No recreational drug use. 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 Avid climber/enthusiast.      PHYSICAL EXAM:    Vitals/ General Appearance:   Vitals:    06/29/19 1942 06/29/19 1953 06/29/19 2204   BP: 154/75     Pulse: (!) 116  68   Resp: 14  16   Temp: 36.8 °C (98.3 °F)     TempSrc: Temporal     SpO2: 96%     Weight:  72 kg (158 lb 11.7 oz)    Height:  1.765 m (5' 9.5\")      Oxygen Therapy:  Pulse Oximetry: 96 %, O2 Delivery: None (Room Air)  Weight/BMI: Body mass index is 23.1 kg/m².    General: well-nourished, well-hydrated, seated in rney in no acute distress, alert and oriented x4  HEENT: normocephalic, atraumatic, EOMI, PERRL, MMM, pharynx without erythema  Neck: normal ROM, supple without tenderness, no lymphadenopathy, no JVD; hyoid bone palpable? NO DROOLING NOTED; APPEARS TO BE SWALLWING SECRETIONS WITHOUT DIFFICULTY  Cardiovascular: RRR with normal S1/S2, no murmurs or extra heart sounds appreciated  Lungs: normal respiratory effort, CTAB, no crackles, no wheezing  Abdomen: non-distended, hypoactive bowel sounds, soft, non-tender to deep palpation, no guarding or rebound, no appreciable masses or hepatosplenomegaly  Skin: RESOLVING PINK DISCOLORATION ON INDEX FINGERS AND UNDER EYES; no current clear lesions or rashes noted; MILD PEELING AND HYPERPIGMENTATION OF CUTICLES; points out a circular erythematous spot on abdomen with appears to be inflamed hair follicle  Extremities: no edema, clubbing, or cyanosis  MSK: moving all extremities, full ROM, no deformities noted  Neurologic: CN II-XII intact, strength 5/5 to all extremities, sensation to light touch intact  Psychiatric: normal " mood, affect, and judgement; VERY PREOCCUPIED WITH HIS CONDITION, INTERRUPTS FREQUENTLY      LAB DATA REVIEWED:  Recent Results (from the past 24 hour(s))   CBC WITH DIFFERENTIAL    Collection Time: 06/29/19 10:09 PM   Result Value Ref Range    WBC 14.0 (H) 4.8 - 10.8 K/uL    RBC 4.85 4.70 - 6.10 M/uL    Hemoglobin 13.2 (L) 14.0 - 18.0 g/dL    Hematocrit 40.4 (L) 42.0 - 52.0 %    MCV 83.3 81.4 - 97.8 fL    MCH 27.2 27.0 - 33.0 pg    MCHC 32.7 (L) 33.7 - 35.3 g/dL    RDW 42.4 35.9 - 50.0 fL    Platelet Count 345 164 - 446 K/uL    MPV 9.7 9.0 - 12.9 fL    Neutrophils-Polys 71.00 44.00 - 72.00 %    Lymphocytes 15.80 (L) 22.00 - 41.00 %    Monocytes 12.40 0.00 - 13.40 %    Eosinophils 0.10 0.00 - 6.90 %    Basophils 0.10 0.00 - 1.80 %    Immature Granulocytes 0.60 0.00 - 0.90 %    Nucleated RBC 0.00 /100 WBC    Neutrophils (Absolute) 9.92 (H) 1.82 - 7.42 K/uL    Lymphs (Absolute) 2.21 1.00 - 4.80 K/uL    Monos (Absolute) 1.74 (H) 0.00 - 0.85 K/uL    Eos (Absolute) 0.02 0.00 - 0.51 K/uL    Baso (Absolute) 0.02 0.00 - 0.12 K/uL    Immature Granulocytes (abs) 0.08 0.00 - 0.11 K/uL    NRBC (Absolute) 0.00 K/uL   COMP METABOLIC PANEL    Collection Time: 06/29/19 10:09 PM   Result Value Ref Range    Sodium 141 135 - 145 mmol/L    Potassium 4.1 3.6 - 5.5 mmol/L    Chloride 103 96 - 112 mmol/L    Co2 28 20 - 33 mmol/L    Anion Gap 10.0 0.0 - 11.9    Glucose 122 (H) 65 - 99 mg/dL    Bun 24 (H) 8 - 22 mg/dL    Creatinine 1.09 0.50 - 1.40 mg/dL    Calcium 9.9 8.5 - 10.5 mg/dL    AST(SGOT) 53 (H) 12 - 45 U/L    ALT(SGPT) 32 2 - 50 U/L    Alkaline Phosphatase 63 30 - 99 U/L    Total Bilirubin 0.5 0.1 - 1.5 mg/dL    Albumin 4.4 3.2 - 4.9 g/dL    Total Protein 7.1 6.0 - 8.2 g/dL    Globulin 2.7 1.9 - 3.5 g/dL    A-G Ratio 1.6 g/dL   ESTIMATED GFR    Collection Time: 06/29/19 10:09 PM   Result Value Ref Range    GFR If African American >60 >60 mL/min/1.73 m 2    GFR If Non African American >60 >60 mL/min/1.73 m 2        IMAGING/PROCEDURES:   None performed yet         ASSESSMENT/PLAN (MEDICAL DECISION MAKING):  Mr. Mauricio Mac is a relatively healthy 57yo male with history of GERD and hiatal hernia who presents with resolving rashes and myalgias with worsening dysphagia and weight loss in the setting of presumptive dermatomyositis diagnosis.     #Dysphagia.   #History of esophagitis.   - Progressive, currently worse with fluids than with solids   - Now with occasional regurgitation   - Reported weight loss of 12 pounds within the last month, 5 pounds within the last week  - May be secondary to suspected dermatomyositis, not fully treated, see below)  - Reports history of esophagitis seen on upper endoscopy with Dr. Kyle November 2018; was encouraged to increase his omeprazole from every other day to once daily; patient has since discontinued omeprazole (due to personal concern for causality of myositis) and switched it to Zantac 150mg po once daily   - On literature review there are studies which suggest H1 blockers may have a VERY RARE class side effect of causing myositis, but feel this is less likely causal of his symptoms   - Although patient reports he has recently had difficulty managing his secretions, I spoke with the patient for approximately 30 minutes and did not see any drooling or choking or other issue with management of secretions  - It is possible that his symptoms have become progressively worse in the setting of DECREASED PPI USE and not as a RESULT of PPI use  - In setting of progressive dysphagia and reported weight loss, feel additional evaluation and intervention is warranted  - Would like to avoid diet for now as patient may be at increased risk of aspiration given reports of choking and regurgitation as above   - Of note, HEENT exam unremarkable; did not appreciate white patches or leukoplakia in mouth/posterior oropharynx, but patient is interested in trying Nystatin swish and spit, which seems  "reasonable as patient has been using steroids and sugars are likely elevated from baseline and medication is relatively benign  Plan:   - NPO   - NS IV @125cc/hr  - Nystatin swish and spit QID  - Barium swallow in AM   - SLP consult to assess which foods might be best outpatient and provide swallowing education  - Aspiration precautions  - Consider GI consult with Dr. Kyle's group in AM for additional recommendations/assess need for repeat upper endoscopy     #Probable dermatomyositis.  - The following symptoms which patient has complained about all appear to be consistent with dermatomyositis:    -Rash around the eyes/heliotrope eruption    - Skin rash described on posterior neck and upper back also known as \"shawl sign\"    - Skin and nail complaints on the extensor surfaces of the hands sound very consistent with Gotton's papules    - Proximal muscle weakness (reports 35 pound DECREASE in his exercise capacity involving proximate muscles)   - Weakness of the striated muscle of the upper one-third of the esophagus, leading to dysphagia or regurgitation, as noted in this patient above   - Per previous outpatient workup:    - CHELITA neg   - Anti SS-A neg   - Anti SS-B neg   - Antiscleroderma-70 neg   - ESR 7, wnl   - Adolase 10.5, high    - CRP 3.2, wnl   - TSH 2.33  - Patient appears to have been started on appropriate glucocorticoids with most recent treatment per guidelines (starting at 60mg/day); however, Up To Date recommends much slower taper than had been planned outpatient, which can take 26 weeks as follows:    - Start with 60mg/day x6 weeks   - Followed by a decrease in dose of 10mg each week until 40mg/day   - Followed by a decrease in dose of 5mg each week until 20mg/day   - Followed by decrease in dose of 2.5mg until 10mg/day   - Followed by decrease in dose of 1 mg every TWO WEEKS until patient reaches 5mg/day   - Although patient appears dissatisfied with results, and is requesting treatment with immune " globulins (given his extensive reading on this), feel he has not yet failed recommended glucocorticoid dosing, and immune globulins are not second line and are also not without cost/risk   Plan:   - Restart prednisone 60mg/day  - Given above physical exam findings, suspect this is likely dermatomyositis; however, patient is relatively insistent on definitive diagnosis   - Consider muscle biopsy as skin biopsy was not conclusive    - Consider myositis specific labs: antisynthetase (including anti-jJo-1), anti-SRP, anti Mi-2 which may be more specific to dermatomyositis    - Consider outpatient EMG vs MRI vs US of muscles given reported muscle weakness  - Referral has been placed outpatient to Dr. Nash, though he has not yet been seen and does not yet have appointment; could consider case discussion if available    #Constipation.  - Bowel regimen ordered       Core Measures:  IVF: NS @125cc/hr  Abx: none   Diet: NPO, pending swallowing evaluations   GI proph: none at this time (Zantac 150mg po daily at home)  DVD proph: Lovenox   Code: FULL       Dispo: observation for IV fluids and swallowing evaluation and possible GI consult

## 2019-06-30 NOTE — PROGRESS NOTES
Assumed care after report received from noc RN. Pt A/Ox4, on RA, PIV site flushed and site WNL, NS infusing at 125/hr. Awaiting transport to barium swallow. Pt NPO except sips with meds d/t dysphasia issues. Complaints of minor rash to hands, eyelids and back, pt declines intervention for this discomfort.

## 2019-06-30 NOTE — ED PROVIDER NOTES
ED Provider Note    CHIEF COMPLAINT  Chief Complaint   Patient presents with   • Difficulty Swallowing     x 2 weeks       HPI  Mauricio Mac is a 58 y.o. male who presents with difficulty swallowing.  The patient has recent been diagnosed with dermatomyositis because of rash, myalgias.  He did have a biopsy which initially was read as idiopathic but upon further review confirmed this as well.  The patient was on a 12-day course of prednisone a few weeks back, his symptoms got improved.  He was off of steroids, but did not really have any success with NSAIDs.  For this reason he is been on prednisone once again for the last week.  What caused him to present today is that for the last 2 weeks he had difficulty swallowing, this is gotten progressively worse.  Over the entire course of this, he is lost about 12 pounds, 5 pounds in the last week.  He has difficulty getting down solid foods, and is having difficulties while getting down liquids.  His rash is improved with his steroids.  Position presents here just because of this ongoing and worsening swallowing difficulty.  There is no fever.  No belly pain.  No other complaint.    PAST MEDICAL HISTORY  Past Medical History:   Diagnosis Date   • Back spasm     occurs about once a year, flexeril as needed.    • Dermatomyositis (HCC)    • GERD (gastroesophageal reflux disease)    • Hiatal hernia     endoscopy   • Rupture of right plantaris tendon        FAMILY HISTORY  Family History   Problem Relation Age of Onset   • Cancer Mother         Melanoma 40s   • Other Father 55        primary amyloidosis- not familial type   • Cancer Maternal Grandmother         colon cancer- around 80s   • Heart Attack Maternal Grandfather         around 60s   • ADD / ADHD Maternal Grandfather    • Diabetes Paternal Grandmother         lives to 80s       SOCIAL HISTORY  Social History   Substance Use Topics   • Smoking status: Never Smoker   • Smokeless tobacco: Never Used   • Alcohol use  "No         SURGICAL HISTORY  Past Surgical History:   Procedure Laterality Date   • PHLEBECTOMY Bilateral 2009   • OTHER  2006    L5-S1 surgery for herniated disc   • INGUINAL HERNIA REPAIR Right 2004   • SHOULDER ARTHROSCOPY Right 1994       CURRENT MEDICATIONS  Home Medications     Reviewed by Kristal Reyna R.N. (Registered Nurse) on 06/29/19 at 2001  Med List Status: Complete   Medication Last Dose Status   cyanocobalamin (VITAMIN B-12) 500 MCG Tab  Active   cyclobenzaprine (FLEXERIL) 5 MG tablet  Active   predniSONE (DELTASONE) 20 MG Tab  Active   raNITidine (ZANTAC) 150 MG Tab  Active                I have reviewed the nurses notes and/or the list brought with the patient.    ALLERGIES  Allergies   Allergen Reactions   • Amoxicillin    • Latex        REVIEW OF SYSTEMS  See HPI for further details. Review of systems as above, otherwise all other systems are negative.     PHYSICAL EXAM  VITAL SIGNS: /75   Pulse 68   Temp 36.8 °C (98.3 °F) (Temporal)   Resp 16   Ht 1.765 m (5' 9.5\")   Wt 72 kg (158 lb 11.7 oz)   SpO2 96%   BMI 23.10 kg/m²     Constitutional: Well appearing patient in no acute distress.  Not toxic, nor ill in appearance.  HENT: Mucus membranes somewhat tacky.  Oropharynx is suspicious for thrush in the posterior pharynx.  Eyes: Pupils equally round.  No scleral icterus.   Neck: Full nontender range of motion.  Lymphatic: No cervical lymphadenopathy noted.   Cardiovascular: Regular heart rate and rhythm.  No murmurs, rubs, nor gallop appreciated.   Thorax & Lungs: Chest is nontender.  Lungs are clear to auscultation with good air movement bilaterally.  No wheeze, rhonchi, nor rales.   Abdomen: Soft, with no tenderness, rebound nor guarding.  No mass, pulsatile mass, nor hepatosplenomegaly appreciated.  Skin: No purpura nor petechia noted.  There is 1 erythematous papule on his anterior abdomen.  He does have some darkening and some scaling on around the orbits " bilaterally.  Extremities/Musculoskeletal: No sign of trauma.  Calves are nontender with no cords nor edema.  No Ludin's sign.  Pulses are intact all around.   Neurologic: Alert & oriented.  Strength and sensation is intact all around.  Gait is normal.  Psychiatric: Normal affect appropriate for the clinical situation.    LABS  Labs Reviewed   CBC WITH DIFFERENTIAL - Abnormal; Notable for the following:        Result Value    WBC 14.0 (*)     Hemoglobin 13.2 (*)     Hematocrit 40.4 (*)     MCHC 32.7 (*)     Lymphocytes 15.80 (*)     Neutrophils (Absolute) 9.92 (*)     Monos (Absolute) 1.74 (*)     All other components within normal limits   COMP METABOLIC PANEL - Abnormal; Notable for the following:     Glucose 122 (*)     Bun 24 (*)     AST(SGOT) 53 (*)     All other components within normal limits   ESTIMATED GFR     MEDICAL RECORD  I have reviewed patient's medical record and pertinent results are listed above.    COURSE & MEDICAL DECISION MAKING  I have reviewed any medical record information, laboratory studies and radiographic results as noted above.  This patient presents with increasing difficulty swallowing with a diagnosis of dermatomyositis, and on the steroids.  He also has a history of GERD.  I do wonder if the patient has esophageal thrush, however also worried about the possible concomitant effect of dysphagia from dermatomyositis.  Reflux esophagitis could also be protruding.  At this point, IV fluids are initiated, as clinically the patient is dehydrated, is unable to take p.o.'s.  I do think that it would be helpful to have him see GI expeditiously, such as for endoscopy.  Also, it would likely be helpful to do a barium swallow.  Logistically, I think the both of these things can wait to the morning time, we can continue with IV fluids in the interim.  I discussed patient's case with the senior family medicine resident, and she is here presently to see him.    FINAL IMPRESSION  1. Dysphagia,  unspecified type    2. Dehydration    3. Dermatomyositis (HCC)           This dictation was created using voice recognition software.    Electronically signed by: Hugo Ybarra, 6/29/2019 11:31 PM

## 2019-07-01 ENCOUNTER — APPOINTMENT (OUTPATIENT)
Dept: RADIOLOGY | Facility: MEDICAL CENTER | Age: 59
End: 2019-07-01
Attending: STUDENT IN AN ORGANIZED HEALTH CARE EDUCATION/TRAINING PROGRAM
Payer: COMMERCIAL

## 2019-07-01 LAB — CK SERPL-CCNC: 270 U/L (ref 0–154)

## 2019-07-01 PROCEDURE — 700102 HCHG RX REV CODE 250 W/ 637 OVERRIDE(OP): Performed by: INTERNAL MEDICINE

## 2019-07-01 PROCEDURE — 74230 X-RAY XM SWLNG FUNCJ C+: CPT

## 2019-07-01 PROCEDURE — 97535 SELF CARE MNGMENT TRAINING: CPT

## 2019-07-01 PROCEDURE — 82550 ASSAY OF CK (CPK): CPT

## 2019-07-01 PROCEDURE — G0378 HOSPITAL OBSERVATION PER HR: HCPCS

## 2019-07-01 PROCEDURE — 700102 HCHG RX REV CODE 250 W/ 637 OVERRIDE(OP): Performed by: FAMILY MEDICINE

## 2019-07-01 PROCEDURE — A9270 NON-COVERED ITEM OR SERVICE: HCPCS | Performed by: INTERNAL MEDICINE

## 2019-07-01 PROCEDURE — 700111 HCHG RX REV CODE 636 W/ 250 OVERRIDE (IP): Performed by: INTERNAL MEDICINE

## 2019-07-01 PROCEDURE — 92526 ORAL FUNCTION THERAPY: CPT

## 2019-07-01 PROCEDURE — A9270 NON-COVERED ITEM OR SERVICE: HCPCS | Performed by: FAMILY MEDICINE

## 2019-07-01 PROCEDURE — 36415 COLL VENOUS BLD VENIPUNCTURE: CPT

## 2019-07-01 PROCEDURE — 92611 MOTION FLUOROSCOPY/SWALLOW: CPT

## 2019-07-01 RX ADMIN — SUCRALFATE 1 G: 1 SUSPENSION ORAL at 18:09

## 2019-07-01 RX ADMIN — NYSTATIN 500000 UNITS: 100000 SUSPENSION ORAL at 15:47

## 2019-07-01 RX ADMIN — NYSTATIN 500000 UNITS: 100000 SUSPENSION ORAL at 18:09

## 2019-07-01 RX ADMIN — NYSTATIN 500000 UNITS: 100000 SUSPENSION ORAL at 09:06

## 2019-07-01 RX ADMIN — NYSTATIN 500000 UNITS: 100000 SUSPENSION ORAL at 21:14

## 2019-07-01 RX ADMIN — FAMOTIDINE 20 MG: 20 TABLET ORAL at 05:55

## 2019-07-01 RX ADMIN — PREDNISONE 40 MG: 10 TABLET ORAL at 05:55

## 2019-07-01 RX ADMIN — SUCRALFATE 1 G: 1 SUSPENSION ORAL at 12:01

## 2019-07-01 RX ADMIN — SUCRALFATE 1 G: 1 SUSPENSION ORAL at 05:55

## 2019-07-01 ASSESSMENT — ENCOUNTER SYMPTOMS
CARDIOVASCULAR NEGATIVE: 1
NERVOUS/ANXIOUS: 1
EYES NEGATIVE: 1
MYALGIAS: 1
WEAKNESS: 1
RESPIRATORY NEGATIVE: 1
GASTROINTESTINAL NEGATIVE: 1

## 2019-07-01 NOTE — PROGRESS NOTES
Pt ambulates independently in room and hallway. Working with physicians to possibly get transferred to [place] per patient request and availability for specialities there. Swallow study with SLP at 1330. Pt able to tolerate ensure between meals. Cream of wheat for breakfast.

## 2019-07-01 NOTE — PROGRESS NOTES
Gastroenterology (GIC) Progress Note     Author: Brady Garcia   Date & Time Created: 7/1/2019 12:12 PM    Chief Complaint:  dysphagia    Interval History:  Still with oropharyngeal dysphagia, unchanged but able to take PO.  Barium esophagogram was inadequate exam with poor barium column filling, I called and spoke to radiologist in reading room who reviewed his films and does not think you can tell whether or not patient actually has a stricture.  I spoke to patient wife and also [facility] medicine team to obtain further history and to coordinate medical care and for medical conference.    HISTORY OF PRESENT ILLNESS: A 58-year-old  male recently diagnosed with dermatomyositis, presents with oropharyngeal/transfer dysphagia. The patient underwent EGD by Dr. Kyle in 10/2018. He was found to have mild reflux esophagitis with irregular distal esophageal Z line, but no evidence of Taylor's esophagus. Gastric biopsies were negative for H. pylori. Benign 4 mm fundic gland polyp was noted. Otherwise, he had been taking omeprazole 20 mg daily with good control of his heartburn and reflux symptoms. He denied dysphagia until after onset of his dermatomyositis symptoms. He has been training for climb to [employer] and then noted muscle soreness and myalgias with weakness. He had initially thought he was over training. Then, he developed a rash on his chest, back, and inner thighs as well as around his face. He was seen by a dermatologist and had lab testing with elevated aldolase and was started on prednisone taper for dermatomyositis. After initiation of corticosteroids, his rash essentially resolved except for around his eyes and hands, and his muscle weakness improved. A few weeks afterwards, then he developed oropharyngeal/transfer dysphagia. He has lost about 12 pounds with poor p.o. intake and anorexia, but he denies any odynophagia or breakthrough heartburn symptoms. He denies prior history of food impaction. He  was switched from Prilosec to Zantac since he was concerned that he had read after doing a Google search about case report of dermatomyositis being caused by proton pump inhibitors. Otherwise, he denied any abdominal pain, nausea, vomiting, hematochezia, melena, or jaundice.     Review of Systems:  Review of Systems   HENT: Negative.    Eyes: Negative.    Respiratory: Negative.    Cardiovascular: Negative.    Gastrointestinal: Negative.    Genitourinary: Negative.    Musculoskeletal: Positive for myalgias.   Skin: Positive for rash.   Neurological: Positive for weakness.   Endo/Heme/Allergies: Negative.    Psychiatric/Behavioral: The patient is nervous/anxious.    All other systems reviewed and are negative.      Physical Exam:  Physical Exam   Constitutional: He is oriented to person, place, and time. He appears well-developed and well-nourished. No distress.   HENT:   Head: Normocephalic and atraumatic.   Mouth/Throat: No oropharyngeal exudate.   Eyes: Pupils are equal, round, and reactive to light. EOM are normal. No scleral icterus.   Neck: Normal range of motion. Neck supple. No JVD present. No tracheal deviation present.   Cardiovascular: Normal rate, regular rhythm, normal heart sounds and intact distal pulses.    No murmur heard.  Pulmonary/Chest: Effort normal and breath sounds normal. No stridor. No respiratory distress.   Abdominal: Soft. Bowel sounds are normal. He exhibits no distension. There is no tenderness. There is no rebound and no guarding.   Musculoskeletal: Normal range of motion. He exhibits no edema or tenderness.   Neurological: He is alert and oriented to person, place, and time. No cranial nerve deficit.   Skin: Skin is warm and dry. Rash noted. He is not diaphoretic. No erythema. No pallor.   Psychiatric: He has a normal mood and affect. His behavior is normal. Judgment and thought content normal.   Nursing note and vitals reviewed.      Labs:          Recent Labs      06/29/19   2693   19   1744   SODIUM  141   --    POTASSIUM  4.1   --    CHLORIDE  103   --    CO2  28   --    BUN  24*   --    CREATININE  1.09   --    MAGNESIUM   --   2.1   CALCIUM  9.9   --      Recent Labs      19   ALTSGPT  32   ASTSGOT  53*   ALKPHOSPHAT  63   TBILIRUBIN  0.5   GLUCOSE  122*     Recent Labs      19   RBC  4.85   HEMOGLOBIN  13.2*   HEMATOCRIT  40.4*   PLATELETCT  345     Recent Labs      19   WBC  14.0*   NEUTSPOLYS  71.00   LYMPHOCYTES  15.80*   MONOCYTES  12.40   EOSINOPHILS  0.10   BASOPHILS  0.10   ASTSGOT  53*   ALTSGPT  32   ALKPHOSPHAT  63   TBILIRUBIN  0.5     Hemodynamics:  Temp (24hrs), Av.4 °C (97.6 °F), Min:36 °C (96.8 °F), Max:36.7 °C (98.1 °F)  Temperature: 36 °C (96.8 °F)  Pulse  Av.7  Min: 60  Max: 116   Blood Pressure: 144/78     Respiratory:    Respiration: 18, Pulse Oximetry: 92 %           Fluids:    Intake/Output Summary (Last 24 hours) at 19 1212  Last data filed at 19 0700   Gross per 24 hour   Intake              860 ml   Output                0 ml   Net              860 ml     Weight: 69.9 kg (154 lb 1.6 oz)  GI/Nutrition:  Orders Placed This Encounter   Procedures   • Diet Order Full Liquid     Standing Status:   Standing     Number of Occurrences:   1     Order Specific Question:   Diet:     Answer:   Full Liquid [11]     Order Specific Question:   Miscellaneous modifications:     Answer:   Lactose Free per PT [7]     Problems:  1. Oropharyngeal transfer dysphagia.   2. Dermatomyositis with myopathy.   3. Gastroesophageal reflux disease, well controlled, asymptomatic.   4. Other specified intestinal malabsorption from prior proton pump inhibitor therapy changed to Pepcid.   5. Elevated AST from dermatomyositis. No evidence of liver disease, rest of LFTs normal.   6. Leukocytosis likely from corticosteroid-induced demargination   7. Seasonal allergic rhinitis.   8. PAST SURGICAL HISTORY: Right shoulder arthroscopic  surgery, hiatal hernia repair, L5-S1 surgery, bilateral stones removed from superficial blood vessels, phlebectomies.   9. ALLERGIES: AMOXICILLIN AND LATEX.   10. FAMILY HISTORY: Father  of amyloidosis at age 85. Mother had melanoma, essential tremor, had also diagnosed with colorectal cancer at older age. Uncle had bone cancer with occupational toxicity exposure.   11. SOCIAL HISTORY: Lives with wife at home. He has a pet parrot. Drinks about 2 alcoholic beverages a week. Denied tobacco or illicit drug use. Retired, previously worked in Law Enforcement, did arson investigations, and avid climber and enthusiast.      Recommendations:  1. Discontinued omeprazole since the patient is worried about it potentially causing dermatomyositis, which I doubt, checked magnesium level and it was normal at 2.1    2. Long-term H2 blocker therapy instead of proton pump inhibitor therapy. Pepcid is okay for now, but as an outpatient, would recommend switching to Zantac instead since it is a  drug with less P450 liver metabolism interaction and other medication interactions.     3. Continue Carafate suspension p.o. t.i.d. x14 days.     4. Await speech pathology barium swallow results.    5. I am hesitant in pursuing EGD with dilation due to dermatomyositis and implications in post-anesthesia or sedation care and risk for respiratory compromise/failure and potentially prolonged mechanical ventilation etc.  Patient needs to have formal rheumatologist consult and clearance before EGD could be pursued since his dermatomyositis is not well controlled.  I spoke to patient and his wife, they both would prefer to get EGD done at [facility] and desire inpatient transfer there if possible.  [facility] medicine team is aware.    6. As an outpatient, he will need follow-up with established GI physician, Dr.Tom Kyle    Quality-Core Measures   Reviewed items::  Labs reviewed and Medications reviewed

## 2019-07-01 NOTE — PROGRESS NOTES
[facility] PROGRESS NOTE     Attending: Caesar Lilly MD    Resident: Daina Linares MD    PATIENT: Mauricio Mac; [id number]; 1960    ID: 58 y.o. male with a presumptive diagnosis of dermatomyositis admitted for worsening dysphagia and weight loss.    SUBJECTIVE: Met with pt in [address] room, with wife on speakerphone. No acute events overnight, Mr. Mac reports no improvement of dysphagia.  They understand ST plan for today to attempt modified barium swallow study to evaluate possible stricture.  Pt and wife both express that if any procedures are to be done, they would like to be transferred to [facility], as they have clinics for DM as well as dysphagia.     OBJECTIVE:     Vitals:    06/30/19 1930 07/01/19 0445 07/01/19 0600 07/01/19 0813   BP: 142/71 151/71  144/78   Pulse: 74 74  73   Resp: 18 18  18   Temp: 36.6 °C (97.8 °F) 36.4 °C (97.6 °F)  36 °C (96.8 °F)   TempSrc: Temporal Temporal  Temporal   SpO2: 95% 94%  92%   Weight:   69.9 kg (154 lb 1.6 oz)    Height:           Intake/Output Summary (Last 24 hours) at 07/01/19 1425  Last data filed at 07/01/19 0700   Gross per 24 hour   Intake              860 ml   Output                0 ml   Net              860 ml       PE:  General: No acute distress, ambulating around halls.  HEENT: NC/AT. EOMI. MMM, no oral ulcers, slight whitening of posterior oropharynx  Cardiovascular: Normal S1/S2, regular rate, no m/r/g  Respiratory: Normal inspiratory effort, CTAB, no adventitious sounds  Abdomen: BS+, soft, NT, ND  EXT:  HAIDER, 5/5 strength, 2+ pulses, Gottron papules present over dorsum of hand b/l, no other discoloration or rashes  Neuro: non focal with no numbness, tingling or changes in sensation    LABS:  Recent Labs      06/29/19   2209   WBC  14.0*   RBC  4.85   HEMOGLOBIN  13.2*   HEMATOCRIT  40.4*   MCV  83.3   MCH  27.2   RDW  42.4   PLATELETCT  345   MPV  9.7   NEUTSPOLYS  71.00   LYMPHOCYTES  15.80*   MONOCYTES  12.40   EOSINOPHILS   0.10   BASOPHILS  0.10     Recent Labs      06/29/19 2209 06/30/19   1744   SODIUM  141   --    POTASSIUM  4.1   --    CHLORIDE  103   --    CO2  28   --    BUN  24*   --    CREATININE  1.09   --    CALCIUM  9.9   --    MAGNESIUM   --   2.1   ALBUMIN  4.4   --      Estimated GFR/CRCL = Estimated Creatinine Clearance: 73.9 mL/min (by C-G formula based on SCr of 1.09 mg/dL).  Recent Labs      06/29/19 2209   GLUCOSE  122*     Recent Labs      06/29/19 2209   ASTSGOT  53*   ALTSGPT  32   TBILIRUBIN  0.5   ALKPHOSPHAT  63   GLOBULIN  2.7     Recent Labs      07/01/19   1206   CPKTOTAL  270*         No results for input(s): INR, APTT, FIBRINOGEN in the last 72 hours.    Invalid input(s): DIMER    MICROBIOLOGY: None    IMAGING:   DX-ESOPHAGUS - [id number]         DX-ESOPHAGUS - BARIUM SWALLOW   Final Result   Addendum 2 of 2   The patient was brought back to the fluoroscopy suite and additional    images obtained using careful oral administration of thin barium in    upright position.  Images show moderate to severe stenosis in the mid    cervical esophagus.  No evidence for thoracic    esophageal mass, focal inflammation or stenosis.  Previously described    dense lesion along the mid thoracic spine is not demonstrated on these    images and was likely artifactual.      These results were discussed with Dr. Dubose on the evening of 6/30/2019.      Addendum 1 of 2   These findings were discussed with Dr Salomon on 6/30/2019 2:52 PM.      Final      1.  Very limited examination.  Tracheal aspiration encountered after several swallows of thin barium at which point the study was concluded.   2.  Probable focal severe stricture of the cervical esophagus.   3.  Further evaluation by speech pathology recommended.   4.  Dense lesion to the RIGHT side of the midthoracic spine, possibly sclerotic.  Consider further evaluation with CT chest.            MEDS:  Current Facility-Administered Medications   Medication Last Dose   •  [START ON 7/2/2019] predniSONE (DELTASONE) tablet 60 mg     • senna-docusate (PERICOLACE or SENOKOT S) 8.6-50 MG per tablet 2 Tab      And   • polyethylene glycol/lytes (MIRALAX) PACKET 1 Packet      And   • magnesium hydroxide (MILK OF MAGNESIA) suspension 30 mL      And   • bisacodyl (DULCOLAX) suppository 10 mg     • acetaminophen (TYLENOL) tablet 650 mg     • nystatin (MYCOSTATIN) 394809 UNIT/ML suspension 500,000 Units 500,000 Units at 07/01/19 0906   • famotidine (PEPCID) tablet 20 mg 20 mg at 07/01/19 0555   • sucralfate (CARAFATE) 1 GM/10ML suspension 1 g 1 g at 07/01/19 1201       PROBLEM LIST:  No problems updated.    ASSESSMENT/PLAN:   Mr. Mac is a 57yo M with Hx of dermatomyositis diagnosed by symptomatology admitted for 2 weeks of worsening dysphagia and weight loss.    #Dysphagia  #History of Esophagitis  - 2wk Hx of progressive dysphagia liquids>solids  - Reports ~20lb weight loss over past month  - Followed by Dr. yKle for Hx of esophagitis - EGD last October demonstrated inflammation, but no areas of stricture.  At that time, Omeprazole dose was increased from 20mg every other day to 20mg qd.  - Initial barium swallow stopped early due to immediate tracheal aspiration  - Repeat study demonstrated stricture in upper esophagus  - SLP Consulted, performed Modified barium study this afternoon. Awaiting report  - GI Consulted - Dr. Garcia not comfortable with performing EGD d/t risks associated with anesthesia and EGD performance in presence of DM diagnosis.  If EGD is required, pt will need transfer to a facility with inpatient Rheumatology for evaluation, diagnosis, and to determine safety to perform this procedure.       #Probable Dermatomyositis  - Dx outpt based on symptomatology and elevated Aldolase.  - Referred to Dr. Nash in Rheum, but has not seen him yet  - Started on outpt glucocorticoid taper, admission was first day of Prednisone 40mg/day dose. Muscle aches and rashes improved with  glucocorticoid administration, dysphagia is unchanged.  Plan  - Refer to [facility] DM clinic upon d/c  - Recommend muscle biopsy and specific DM immuno panel outpatient      Core Measures:  Fluids: None  Abx: None  Diet: Thin Liquids  GI PPX: Pepcid 20mg qd  DVT PPX: Lovenox  DISPO: Inpt awaiting results from Barium Swallow and GI recs    CODE STATUS: Full

## 2019-07-01 NOTE — CARE PLAN
Problem: Safety  Goal: Will remain free from falls  Outcome: PROGRESSING AS EXPECTED  Pt ambulates with steady gait independently in paz and room.     Problem: Pain Management  Goal: Pain level will decrease to patient's comfort goal  Outcome: PROGRESSING AS EXPECTED  Pt denies pain. Rates 0/10.

## 2019-07-01 NOTE — DISCHARGE PLANNING
Transfer Center:    Received call Dr. Daina Linares regarding transfer to [place] for Inpatient Rheumatology service to cleared for EGD by GI.      Call placed to [facility] Transfer Center @ [phone].  Spoke with Nathaniel.  Initiate request for higher level of care transfer.  Patient information provided and contact mobile number for referring resident.  Requested for medical records to be reviewed.  H&P, Consults, Progress notes, Labs, Radiology reports, and Demographics faxed @ [phone].  Confirmation received.    Call placed to [facility] @ [phone].  Spoke with Nicolas.  Initiate Prior Auth for Inpatient (Acute) Admission to [facility].  Requested for medical records.  Faxed @ [phone].  Confirmation received. Case #:  [id number].    Plan:  Pending acceptance from [facility] and Prior Auth from [facility].

## 2019-07-01 NOTE — DIETARY
"Nutrition services: Day 0 of admit.  Mauricio Mac is a 58 y.o. male with admitting DX of Dysphagia.     Nutrition Services - weight loss reported on admission. Malnutrition Screening Tool score 2. Attempted interview X 3, pt unavailable today (out of room X 2, staff in working with pt).          Assessment:  Height: 176.5 cm (5' 9.5\")  Weight: 69.9 kg (154 lb 1.6 oz)  Body mass index is 22.43 kg/m²., BMI classification: Normal wt.   Diet/Intake: Full Liquid, Lactose Free per PT, Supplements between meals added today.     Evaluation:   1. Per MD notes, pt with 20# wt loss over past month; 2 week history of progressive dysphagia liquids>solids. Pt with possible dermatomyositis.  2. Pertinent Meds: Pepcid, Prednisone, Bowel protocol, carafate.   3. SLP following, pt s/p MBS today and awaiting results.     Malnutrition Risk: limited information.     Recommendations/Plan:  1. Boost Plus supplements added to meals to day per MD orders.   2. Encourage PO and document all intake  as % taken in ADL's to provide interdisciplinary communication across all shifts.   3. Monitor weight.  4. Nutrition rep will continue to see patient for ongoing meal and snack preferences.     RD following.           "

## 2019-07-01 NOTE — THERAPY
"Speech Language Therapy dysphagia treatment completed.   Functional Status: Patient currently on full liquid diet and reports that he still has intermittent difficulty w/ globus sensation at the cervical/thoracic region. Patient reports that he feels less globus sensation on purees and Cream of Wheat than on purees. Patient consumed several sips of thins via cup sip, applesauce, and Cream of Wheat. Patient had no overt s/sx of aspiration on any consistencies trialed. However, c/o globus was noted at the lower cervical and upper thoracic region on all consistencies, with greater report on liquids. Of note, patient was noted to have severe cervical stricture on esophagram yesterday. Tracheal aspiration was also noted on barium, but patient reports this is when he was lying almost flat and drinking from a straw. At this time, recommend patient proceed with MBS today to further evaluate oropharyngeal swallow function and r/o aspiration. RN aware. SLP is following for MBS today.     Recommendations: At this time, recommend patient proceed with MBS today to further evaluate oropharyngeal swallow function and r/o aspiration.   Plan of Care: Will benefit from Speech Therapy 5 times per week  Post-Acute Therapy: Recommend inpatient transitional care services for continued speech therapy services.      See \"Rehab Therapy-Acute\" Patient Summary Report for complete documentation.     "

## 2019-07-01 NOTE — CARE PLAN
Problem: Nutritional:  Goal: Achieve adequate nutritional intake  Patient will consume >50% of meals/supplements  Outcome: NOT MET

## 2019-07-02 ENCOUNTER — PATIENT OUTREACH (OUTPATIENT)
Dept: HEALTH INFORMATION MANAGEMENT | Facility: OTHER | Age: 59
End: 2019-07-02

## 2019-07-02 VITALS
RESPIRATION RATE: 18 BRPM | BODY MASS INDEX: 21.94 KG/M2 | WEIGHT: 153.22 LBS | HEART RATE: 87 BPM | DIASTOLIC BLOOD PRESSURE: 68 MMHG | TEMPERATURE: 97.4 F | OXYGEN SATURATION: 93 % | SYSTOLIC BLOOD PRESSURE: 137 MMHG | HEIGHT: 70 IN

## 2019-07-02 PROCEDURE — A9270 NON-COVERED ITEM OR SERVICE: HCPCS | Performed by: INTERNAL MEDICINE

## 2019-07-02 PROCEDURE — A9270 NON-COVERED ITEM OR SERVICE: HCPCS | Performed by: FAMILY MEDICINE

## 2019-07-02 PROCEDURE — 700102 HCHG RX REV CODE 250 W/ 637 OVERRIDE(OP): Performed by: INTERNAL MEDICINE

## 2019-07-02 PROCEDURE — 92526 ORAL FUNCTION THERAPY: CPT

## 2019-07-02 PROCEDURE — 700111 HCHG RX REV CODE 636 W/ 250 OVERRIDE (IP): Performed by: STUDENT IN AN ORGANIZED HEALTH CARE EDUCATION/TRAINING PROGRAM

## 2019-07-02 PROCEDURE — 700102 HCHG RX REV CODE 250 W/ 637 OVERRIDE(OP): Performed by: FAMILY MEDICINE

## 2019-07-02 PROCEDURE — G0378 HOSPITAL OBSERVATION PER HR: HCPCS

## 2019-07-02 RX ORDER — PREDNISONE 20 MG/1
60 TABLET ORAL DAILY
Qty: 90 TAB | Refills: 0 | Status: SHIPPED | OUTPATIENT
Start: 2019-07-02 | End: 2022-02-03

## 2019-07-02 RX ORDER — SUCRALFATE ORAL 1 G/10ML
1 SUSPENSION ORAL
Qty: 36 ML | Refills: 0 | Status: SHIPPED | OUTPATIENT
Start: 2019-07-02 | End: 2022-07-22

## 2019-07-02 RX ADMIN — NYSTATIN 500000 UNITS: 100000 SUSPENSION ORAL at 12:16

## 2019-07-02 RX ADMIN — NYSTATIN 500000 UNITS: 100000 SUSPENSION ORAL at 10:00

## 2019-07-02 RX ADMIN — PREDNISONE 60 MG: 10 TABLET ORAL at 06:53

## 2019-07-02 RX ADMIN — SUCRALFATE 1 G: 1 SUSPENSION ORAL at 12:16

## 2019-07-02 RX ADMIN — FAMOTIDINE 20 MG: 20 TABLET ORAL at 06:53

## 2019-07-02 RX ADMIN — SUCRALFATE 1 G: 1 SUSPENSION ORAL at 06:53

## 2019-07-02 ASSESSMENT — ENCOUNTER SYMPTOMS
VOMITING: 0
NAUSEA: 0
MYALGIAS: 1
EYES NEGATIVE: 1
CARDIOVASCULAR NEGATIVE: 1
ABDOMINAL PAIN: 0
RESPIRATORY NEGATIVE: 1
WEAKNESS: 1
PSYCHIATRIC NEGATIVE: 1

## 2019-07-02 NOTE — DISCHARGE PLANNING
Transfer Center:    Phone call placed to [employer], spoke with Nella regarding authorization of patient transfer to [facility].  Nella provided a pending inpatient authorization number for stay at [facility] [id number]. Once patient is accepted at [facility] they will need to inform insurance company of accepting MD and admit date.  Per Nella patient only has emergency transportation benefits, she states it is likely patient will incur out-of-pocket charges for the cost of transportation.    Nella also provided the authorization number for the [facility] inpatient stay [id number].    Reference number for phone call is [id number].

## 2019-07-02 NOTE — CARE PLAN
Problem: Safety  Goal: Will remain free from falls  Outcome: PROGRESSING AS EXPECTED  Pt is up self ad shaq with a steady gait. No fall risk at this time.

## 2019-07-02 NOTE — DISCHARGE PLANNING
Transfer Center:    Call placed to [facility].  Spoke with Opal.  Request for an update.  There is no bed at this time.  Discussed with her the option for patient to come POV and that attending MD deemed him stable.  She relayed that this will not be allowed.  If patient is stable to be discharge from [facility] then he does not need IP service.    Call placed to attending MD, Dr. Daina Linares.  Updated on status.  She will speak with patient and family.    Call placed to [facility] on unit.  Updated on status.    Plan:  Pending call back from MD.

## 2019-07-02 NOTE — DISCHARGE SUMMARY
"PATIENT DISCHARGE SUMMARY     Admit Date:  6/29/2019       Discharge Date:   7/2/19    Service:   [facility] Team  Attending Physician(s):   Caesar Lilly MD       Senior Resident(s):   Daian Linares MD  Zackary Resident(s):   Cuba Velez MD      Primary Diagnosis:   Dysphagia  Dermatomyositis - clinical diagnosis    Secondary Diagnoses:                GERD w/ hiatal hernia  Seasonal allergic rhinitis    Hospital Summary (Brief Narrative):       Patient admitted with 2 week history of worsening dysphagia worse with liquids over solids associated with 12 pound weight loss. Barium esophagram ordered, first attempt patient showed signs of aspiration and attempt was aborted, on second attempt there was concern for stricture. GI was consulted, reviewed films and stated it was unclear whether stricture was present, however given patient's suspected dermatomyositis they were uncomfortable with procedure that would involve sedation without rheumatology consult. Speech language pathology consulted who completed swallow study, also witnessed abnormality in swallowing patterns consistent with muscular abnormality, and patient kept on full liquid diet. Given his weight loss 2/2 to above symptoms and on in depth discussion with patient, GI and team, decided transfer to [place] for inpatient rheumatologic consult and possible EGD would be in patient's best interest and provide best care.   He was continued on 60mg prednisone daily, has had 2 courses since initiation of his illness 3 months ago. He has not been on any further immune modulators.    Diagnosis of dermatomyositis was made clinically with heliotropic rash, \"shawl-sign\" rash, Gottron's papules, and proximal muscle weakness. Did have skin biopsy with dermatology c/w diagnosis. Outpatient labwork significant for elevated aldolase, elevated AST, elevated CPK.     Consultants:      GI  SLP    Procedures:        Swallow study     Imaging/ Testing:      Barium " esophagram - which showed possible muscle spasms, also a dark area on cervical esophagus that was of uncertain etiology    Results for orders placed or performed during the hospital encounter of 06/29/19   CBC WITH DIFFERENTIAL   Result Value Ref Range    WBC 14.0 (H) 4.8 - 10.8 K/uL    RBC 4.85 4.70 - 6.10 M/uL    Hemoglobin 13.2 (L) 14.0 - 18.0 g/dL    Hematocrit 40.4 (L) 42.0 - 52.0 %    MCV 83.3 81.4 - 97.8 fL    MCH 27.2 27.0 - 33.0 pg    MCHC 32.7 (L) 33.7 - 35.3 g/dL    RDW 42.4 35.9 - 50.0 fL    Platelet Count 345 164 - 446 K/uL    MPV 9.7 9.0 - 12.9 fL    Neutrophils-Polys 71.00 44.00 - 72.00 %    Lymphocytes 15.80 (L) 22.00 - 41.00 %    Monocytes 12.40 0.00 - 13.40 %    Eosinophils 0.10 0.00 - 6.90 %    Basophils 0.10 0.00 - 1.80 %    Immature Granulocytes 0.60 0.00 - 0.90 %    Nucleated RBC 0.00 /100 WBC    Neutrophils (Absolute) 9.92 (H) 1.82 - 7.42 K/uL    Lymphs (Absolute) 2.21 1.00 - 4.80 K/uL    Monos (Absolute) 1.74 (H) 0.00 - 0.85 K/uL    Eos (Absolute) 0.02 0.00 - 0.51 K/uL    Baso (Absolute) 0.02 0.00 - 0.12 K/uL    Immature Granulocytes (abs) 0.08 0.00 - 0.11 K/uL    NRBC (Absolute) 0.00 K/uL   COMP METABOLIC PANEL   Result Value Ref Range    Sodium 141 135 - 145 mmol/L    Potassium 4.1 3.6 - 5.5 mmol/L    Chloride 103 96 - 112 mmol/L    Co2 28 20 - 33 mmol/L    Anion Gap 10.0 0.0 - 11.9    Glucose 122 (H) 65 - 99 mg/dL    Bun 24 (H) 8 - 22 mg/dL    Creatinine 1.09 0.50 - 1.40 mg/dL    Calcium 9.9 8.5 - 10.5 mg/dL    AST(SGOT) 53 (H) 12 - 45 U/L    ALT(SGPT) 32 2 - 50 U/L    Alkaline Phosphatase 63 30 - 99 U/L    Total Bilirubin 0.5 0.1 - 1.5 mg/dL    Albumin 4.4 3.2 - 4.9 g/dL    Total Protein 7.1 6.0 - 8.2 g/dL    Globulin 2.7 1.9 - 3.5 g/dL    A-G Ratio 1.6 g/dL   ESTIMATED GFR   Result Value Ref Range    GFR If African American >60 >60 mL/min/1.73 m 2    GFR If Non African American >60 >60 mL/min/1.73 m 2   TSH WITH REFLEX TO FT4   Result Value Ref Range    TSH 0.970 0.380 - 5.330 uIU/mL    MAGNESIUM   Result Value Ref Range    Magnesium 2.1 1.5 - 2.5 mg/dL   CREATINE KINASE   Result Value Ref Range    CPK Total 270 (H) 0 - 154 U/L   Labs done outpatient:  Aldolase 10.5  CHELITA negative  Sjogren's Anti-SS-A and SS-B negative  Antiscleroderma-70 antibodies negative  ESR 7 (normal)  CRP 3.2 (normal)    Disposition:   Discharge to go by private vehicle to [place] for inpatient rheumatology and possible GI as patient unable to receive adequate inpatient specialist care. Patient was havi    Diet:   Full liquid diet     Activity:   As tolerated

## 2019-07-02 NOTE — THERAPY
Speech Language Therapy MBS completed.  Functional Status: Patient seen this date for modified barium swallow study. Patient awake, alert, pleasant, and transported down to radiology via MBS chair without incident. Patient consumed PO trials of thins via tsp, cup sip, and straw, purees, pudding, soft solids, and mixed consistencies. Patient presented with moderate to severe oropharyngeal dysphagia, evidenced by impaired velar elevation, weak laryngeal elevation, impaired BoT retraction, weak pharyngeal constriction, and multiple effortful swallows required to clear boluses. Patient had premature spillage to the vallecula before the swallow, reduced epiglottic inversion, and significant residue in the vallecula, pyriform sinuses and lateral channels after the swallow that patient had difficulty clearing despite strategies. Patient had deep laryngeal penetration x1 on consecutive straw sips of thins after the swallow. In the esophagus, at the level of the lower cervical spine and upper thoracic spine, patient had possible muscle spasm vs. CP bar (appears at too low of a level) vs. unknown obstruction causing change in the course of the bolus. Patient appeared to have decreased peristalsis of the esophagus and when combined with possible obstruction, patient appeared to have piecemeal passage of bolus into the lower esophagus. Patient had fatigue as evaluation progressed. At this time, recommend patient continue full liquid diet w/ use of swallow precautions. Recommend further workup and imaging of possible obstruction d/t unknown etiology. RN and MD aware. SLP is following.     Recommendations - Diet: 1.) Full liquid    2.) Recommend further workup and imaging of possible obstruction (see above) d/t unknown etiology.                           Strategies: Monitor during meals, No Straws and Head of Bed at 90 Degrees                          Medication Administration: Crush all Medications in Puree  Plan of Care: Will  "benefit from Speech Therapy 3 times per week  Post-Acute Therapy: Recommend inpatient transitional care services for continued speech therapy services.      See \"Rehab Therapy-Acute\" Patient Summary Report for complete documentation.   "

## 2019-07-02 NOTE — DISCHARGE INSTRUCTIONS
Discharge Instructions    Discharged to home by car with wife. Discharged via walking, hospital escort: No  Special equipment needed: Not Applicable    Be sure to schedule a follow-up appointment with your primary care doctor or any specialists as instructed.     Discharge Plan:   Diet Plan: Discussed  Activity Level: Discussed  Confirmed Follow up Appointment: No (Comments)  Medication Reconciliation Updated: Yes  Influenza Vaccine Indication: Not indicated: Previously immunized this influenza season and > 8 years of age    I understand that a diet low in cholesterol, fat, and sodium is recommended for good health. Unless I have been given specific instructions below for another diet, I accept this instruction as my diet prescription.   Other diet: Regular    Special Instructions: None    · Is patient discharged on Warfarin / Coumadin?   No     Depression / Suicide Risk    As you are discharged from this [facility] facility, it is important to learn how to keep safe from harming yourself.    Recognize the warning signs:  · Abrupt changes in personality, positive or negative- including increase in energy   · Giving away possessions  · Change in eating patterns- significant weight changes-  positive or negative  · Change in sleeping patterns- unable to sleep or sleeping all the time   · Unwillingness or inability to communicate  · Depression  · Unusual sadness, discouragement and loneliness  · Talk of wanting to die  · Neglect of personal appearance   · Rebelliousness- reckless behavior  · Withdrawal from people/activities they love  · Confusion- inability to concentrate     If you or a loved one observes any of these behaviors or has concerns about self-harm, here's what you can do:  · Talk about it- your feelings and reasons for harming yourself  · Remove any means that you might use to hurt yourself (examples: pills, rope, extension cords, firearm)  · Get professional help from the community (Mental Health,  Substance Abuse, psychological counseling)  · Do not be alone:Call your Safe Contact- someone whom you trust who will be there for you.  · Call your local CRISIS HOTLINE 992-7636 or 203-980-2289  · Call your local Children's Mobile Crisis Response Team [place] (997) 339-4902 or www.[employer]  · Call the toll free National Suicide Prevention Hotlines   · National Suicide Prevention Lifeline 476-169-ISBN (6625)  · National Hope Line Network 800-SUICIDE (849-8006)

## 2019-07-02 NOTE — DISCHARGE PLANNING
Transfer Center:    Received call from [facility] requested to fill out the Transfer Back Agreement forms and fax back after required signatures from MD, patient, Hosp rep.    Call placed to Dr. Daina Linares.  Received telephone order for signature with 2 RNs confirmed.    Call placed to unit's charge RN.  Spoke with Linda.  Requested to have patient sign form and fax back to .    Plan:  Will fax TBA forms to [facility].  Pending acceptance and room information.

## 2019-07-02 NOTE — PROGRESS NOTES
[facility] MEDICINE PROGRESS NOTE     Attending: Caesar Lilly MD    Resident: Daina Linares MD    PATIENT: Mauricio Mac; [id number]; 1960    ID: 58 y.o. Male with a history of presumptive dermatomyositis admitted for progressive dysphagia and weight loss.    SUBJECTIVE: No acute events overnight, Mr. Mac is feeling well this am, able to drink Boost and eat chicken broth.  He spoke with insurance company and believes [place] may require further documentation to allow acceptance of transfer. He occasionally coughs up slightly green phlegm, but denies persistent cough, rhinorrhea, congestion, or sore throat.  He otherwise has no complaints.    OBJECTIVE:     Vitals:    07/01/19 0813 07/01/19 1512 07/01/19 2000 07/02/19 0400   BP: 144/78 147/61 147/68 129/64   Pulse: 73 81 68 81   Resp: 18 18 16 16   Temp: 36 °C (96.8 °F) 36.1 °C (97 °F) 36.5 °C (97.7 °F) 37.1 °C (98.7 °F)   TempSrc: Temporal Temporal Temporal Temporal   SpO2: 92% 93% 95% 96%   Weight:    69.5 kg (153 lb 3.5 oz)   Height:           Intake/Output Summary (Last 24 hours) at 07/02/19 0700  Last data filed at 07/01/19 2115   Gross per 24 hour   Intake              600 ml   Output                0 ml   Net              600 ml       PE:   General: No acute distress, resting comfortably in bed.  HEENT: NC/AT. PERRLA. EOMI. MMM, no erythema or exudate.  Cardiovascular: Normal S1/S2, RRR, no m/r/g  Respiratory: Symmetric inspiratory effort. CTAB with no adventitious sounds  Abdomen: BS+, soft, NT/ND   EXT:  HAIDER, 5/5 strength, 2+ pulses, no rashes, bruising, or bleeding.  Neuro: Non focal with no numbness, tingling or changes in sensation    LABS:  Recent Labs      06/29/19   2209   WBC  14.0*   RBC  4.85   HEMOGLOBIN  13.2*   HEMATOCRIT  40.4*   MCV  83.3   MCH  27.2   RDW  42.4   PLATELETCT  345   MPV  9.7   NEUTSPOLYS  71.00   LYMPHOCYTES  15.80*   MONOCYTES  12.40   EOSINOPHILS  0.10   BASOPHILS  0.10     Recent Labs      06/29/19   2209  06/30/19   1745    SODIUM  141   --    POTASSIUM  4.1   --    CHLORIDE  103   --    CO2  28   --    BUN  24*   --    CREATININE  1.09   --    CALCIUM  9.9   --    MAGNESIUM   --   2.1   ALBUMIN  4.4   --      Estimated GFR/CRCL = Estimated Creatinine Clearance: 73.5 mL/min (by C-G formula based on SCr of 1.09 mg/dL).  Recent Labs      06/29/19   2209   GLUCOSE  122*     Recent Labs      06/29/19 2209   ASTSGOT  53*   ALTSGPT  32   TBILIRUBIN  0.5   ALKPHOSPHAT  63   GLOBULIN  2.7     Recent Labs      07/01/19   1206   CPKTOTAL  270*         No results for input(s): INR, APTT, FIBRINOGEN in the last 72 hours.    Invalid input(s): DIMER    MICROBIOLOGY: None    IMAGING:   DX-ESOPHAGUS - [id number]   Final Result      DX-ESOPHAGUS - BARIUM SWALLOW   Final Result   Addendum 2 of 2   The patient was brought back to the fluoroscopy suite and additional    images obtained using careful oral administration of thin barium in    upright position.  Images show moderate to severe stenosis in the mid    cervical esophagus.  No evidence for thoracic    esophageal mass, focal inflammation or stenosis.  Previously described    dense lesion along the mid thoracic spine is not demonstrated on these    images and was likely artifactual.      These results were discussed with Dr. Dubose on the evening of 6/30/2019.      Addendum 1 of 2   These findings were discussed with Dr Salomon on 6/30/2019 2:52 PM.      Final      1.  Very limited examination.  Tracheal aspiration encountered after several swallows of thin barium at which point the study was concluded.   2.  Probable focal severe stricture of the cervical esophagus.   3.  Further evaluation by speech pathology recommended.   4.  Dense lesion to the RIGHT side of the midthoracic spine, possibly sclerotic.  Consider further evaluation with CT chest.          MEDS:  Current Facility-Administered Medications   Medication Last Dose   • predniSONE (DELTASONE) tablet 60 mg 60 mg at 07/02/19 0653   •  senna-docusate (PERICOLACE or SENOKOT S) 8.6-50 MG per tablet 2 Tab      And   • polyethylene glycol/lytes (MIRALAX) PACKET 1 Packet      And   • magnesium hydroxide (MILK OF MAGNESIA) suspension 30 mL      And   • bisacodyl (DULCOLAX) suppository 10 mg     • acetaminophen (TYLENOL) tablet 650 mg     • nystatin (MYCOSTATIN) 580474 UNIT/ML suspension 500,000 Units 500,000 Units at 07/01/19 2114   • famotidine (PEPCID) tablet 20 mg 20 mg at 07/02/19 0653   • sucralfate (CARAFATE) 1 GM/10ML suspension 1 g 1 g at 07/02/19 0653       PROBLEM LIST:  No problems updated.    ASSESSMENT/PLAN: Mr. Mac is a 59yo M with Hx of dermatomyositis diagnosed by symptomatology admitted for 2 weeks of worsening dysphagia and weight loss.     #Dysphagia  #History of Esophagitis  - 2wk Hx of progressive dysphagia liquids>solids  - Reports ~20lb weight loss over past month  - Followed by Dr. Kyle for Hx of esophagitis - EGD last October demonstrated inflammation, but no areas of stricture.  At that time, Omeprazole dose was increased from 20mg every other day to 20mg qd.  - MBS yesterday demonstrated area of significant narrowing in upper esophagus consistent with location of possible stricture seen on initial barium swallow.  He required significant effort to swallow barium solution  - Determined yesterday that Pt requires inpatient rheumatology to evaluate and clear prior to EGD to visualize esophagus.  Pt and wife request this to be at [facility]  Plan  - Transfer to [place] initiated yesterday, awaiting bed availability  - Will inquire about transfer requirements with transfer center        #Probable Dermatomyositis  - Dx outpt based on symptomatology and elevated Aldolase.  - Referred to Dr. Nash in Rheum, but has not seen him yet  - Started on outpt glucocorticoid taper, admission was first day of Prednisone 40mg/day dose. Muscle aches and rashes improved with glucocorticoid administration, dysphagia is unchanged.  Plan  -  Transfer to [facility] pending bed availability  - Recommend muscle biopsy and specific DM immuno panel        Core Measures:  Fluids: None  Abx: None  Diet: Full Liquids  GI PPX: Pepcid 20mg qd  DVT PPX: Lovenox  DISPO: Transfer to [facility] pending bed availability     CODE STATUS: Full    David Velez MD  PGY1  [facility]

## 2019-07-02 NOTE — PROGRESS NOTES
Pt plans to transfer to [place] in the morning once insurance authorization is completed. Dr. Linares with [facility] aware.

## 2019-07-02 NOTE — PROGRESS NOTES
Gastroenterology (G[facility]) Progress Note     Author: Brady Garcia   Date & Time Created: 7/2/2019 1:04 PM    Chief Complaint: Oropharyngeal dysphagia    Interval History:  Tolerating liquid diet without problems including nutritional supplements.  Still with oropharyngeal dysphagia, unchanged but able to take PO.  Muscle weakness and rash persists.  Denied further modifying factors, associated symptoms or timing issues.     HISTORY OF PRESENT ILLNESS: A 58-year-old  male recently diagnosed with dermatomyositis, presents with oropharyngeal/transfer dysphagia. The patient underwent EGD by Dr. Kyle in 10/2018. He was found to have mild reflux esophagitis with irregular distal esophageal Z line, but no evidence of Taylor's esophagus. Gastric biopsies were negative for H. pylori. Benign 4 mm fundic gland polyp was noted. Otherwise, he had been taking omeprazole 20 mg daily with good control of his heartburn and reflux symptoms. He denied dysphagia until after onset of his dermatomyositis symptoms. He has been training for climb to [employer] and then noted muscle soreness and myalgias with weakness. He had initially thought he was over training. Then, he developed a rash on his chest, back, and inner thighs as well as around his face. He was seen by a dermatologist and had lab testing with elevated aldolase and was started on prednisone taper for dermatomyositis. After initiation of corticosteroids, his rash essentially resolved except for around his eyes and hands, and his muscle weakness improved. A few weeks afterwards, then he developed oropharyngeal/transfer dysphagia. He has lost about 12 pounds with poor p.o. intake and anorexia, but he denies any odynophagia or breakthrough heartburn symptoms. He denies prior history of food impaction. He was switched from Prilosec to Zantac since he was concerned that he had read after doing a Google search about case report of dermatomyositis being caused by proton  pump inhibitors. Otherwise, he denied any abdominal pain, nausea, vomiting, hematochezia, melena, or jaundice.     Review of Systems:  Review of Systems   Constitutional: Positive for malaise/fatigue.   Eyes: Negative.    Respiratory: Negative.    Cardiovascular: Negative.    Gastrointestinal: Negative for abdominal pain, melena, nausea and vomiting.   Musculoskeletal: Positive for myalgias.   Skin: Negative.    Neurological: Positive for weakness.   Endo/Heme/Allergies: Negative.    Psychiatric/Behavioral: Negative.    All other systems reviewed and are negative.      Physical Exam:  Physical Exam   Constitutional: He is oriented to person, place, and time. He appears well-developed and well-nourished. No distress.   HENT:   Head: Normocephalic and atraumatic.   Mouth/Throat: No oropharyngeal exudate.   Eyes: Pupils are equal, round, and reactive to light. EOM are normal. No scleral icterus.   Neck: Normal range of motion. Neck supple. No JVD present. No tracheal deviation present.   Cardiovascular: Normal rate, regular rhythm, normal heart sounds and intact distal pulses.    Pulmonary/Chest: Effort normal and breath sounds normal. No stridor. No respiratory distress. He has no wheezes. He has no rales.   Abdominal: Soft. Bowel sounds are normal. He exhibits no distension. There is no tenderness. There is no rebound and no guarding.   Musculoskeletal: Normal range of motion. He exhibits no edema or tenderness.   Neurological: He is alert and oriented to person, place, and time. Coordination normal.   Skin: Skin is warm and dry. Rash noted. He is not diaphoretic.   Psychiatric: He has a normal mood and affect. His behavior is normal. Judgment and thought content normal.   Nursing note and vitals reviewed.      Labs:      Recent Labs      07/01/19   1206   CPKTOTAL  270*     Recent Labs      06/29/19   2209  06/30/19   1744   SODIUM  141   --    POTASSIUM  4.1   --    CHLORIDE  103   --    CO2  28   --    BUN  24*    --    CREATININE  1.09   --    MAGNESIUM   --   2.1   CALCIUM  9.9   --      Recent Labs      19   ALTSGPT  32   ASTSGOT  53*   ALKPHOSPHAT  63   TBILIRUBIN  0.5   GLUCOSE  122*     Recent Labs      19   RBC  4.85   HEMOGLOBIN  13.2*   HEMATOCRIT  40.4*   PLATELETCT  345     Recent Labs      19   WBC  14.0*   NEUTSPOLYS  71.00   LYMPHOCYTES  15.80*   MONOCYTES  12.40   EOSINOPHILS  0.10   BASOPHILS  0.10   ASTSGOT  53*   ALTSGPT  32   ALKPHOSPHAT  63   TBILIRUBIN  0.5     Hemodynamics:  Temp (24hrs), Av.5 °C (97.7 °F), Min:36.1 °C (97 °F), Max:37.1 °C (98.7 °F)  Temperature: 36.3 °C (97.4 °F)  Pulse  Av.5  Min: 60  Max: 116   Blood Pressure: 137/68     Respiratory:    Respiration: 18, Pulse Oximetry: 93 %           Fluids:    Intake/Output Summary (Last 24 hours) at 19 1304  Last data filed at 19 2115   Gross per 24 hour   Intake              600 ml   Output                0 ml   Net              600 ml     Weight: 69.5 kg (153 lb 3.5 oz)  GI/Nutrition:  Orders Placed This Encounter   Procedures   • Diet Order Regular (No red meat please)     Standing Status:   Standing     Number of Occurrences:   1     Order Specific Question:   Diet:     Answer:   Regular [1]     Comments:   No red meat please     Order Specific Question:   Texture/Fiber modifications:     Answer:   Dysphagia 2(Pureed/Chopped)specify fluid consistency(question 6) [2]     Order Specific Question:   Consistency/Fluid modifications:     Answer:   Thin Liquids [3]     Order Specific Question:   Miscellaneous modifications:     Answer:   Lactose Free per PT [7]       Problems:  1. Oropharyngeal transfer dysphagia from #1  2. Dermatomyositis with myopathy.   3. Gastroesophageal reflux disease, well controlled, asymptomatic.   4. Elevated creatinine kinase.   5. Elevated AST from dermatomyositis. No evidence of liver disease, rest of LFTs normal.   6. Leukocytosis likely from  corticosteroid-induced demargination   7. Seasonal allergic rhinitis.   8. PAST SURGICAL HISTORY: Right shoulder arthroscopic surgery, hiatal hernia repair, L5-S1 surgery, bilateral stones removed from superficial blood vessels, phlebectomies.   9. ALLERGIES: AMOXICILLIN AND LATEX.   10. FAMILY HISTORY: Father  of amyloidosis at age 85. Mother had melanoma, essential tremor, had also diagnosed with colorectal cancer at older age. Uncle had bone cancer with occupational toxicity exposure.   11. SOCIAL HISTORY: Lives with wife at home. He has a pet parrot. Drinks about 2 alcoholic beverages a week. Denied tobacco or illicit drug use. Retired, previously worked in Law Enforcement, did arson investigations, and avid climber and enthusiast.      Recommendations:  1. I reviewed speech pathologist report and cine esophagogram images.  The report read possible cricopharyngeal bar but appear too low but more likely muscle spasm with progressive fatigue, thus I believe that this is consistent with poorly-controlled dermatomyositis and he should have formal rheumatologist consult with optimized management and only then if symptoms persist then can consider EGD dilation.    2.  Pending transfer to [facility], thus I will sign off.    3. Discontinued omeprazole since the patient is worried about it potentially causing dermatomyositis, which I doubt, checked magnesium level and it was normal at 2.1     4. Long-term H2 blocker therapy instead of proton pump inhibitor therapy. Pepcid is okay for now, but as an outpatient, would recommend switching to Zantac instead since it is a  drug with less P450 liver metabolism interaction and other medication interactions.      5. Continue Carafate suspension p.o. t.i.d. x14 days.      6.  Again, I am hesitant in pursuing EGD with dilation due to dermatomyositis and implications in post-anesthesia or sedation care and risk for respiratory compromise/failure and  potentially prolonged mechanical ventilation etc.  In addition there is a clear associated of interstitial lung disease with dermatomyositis and he should have PFTs first.    7.  I had spoken to patient and his wife, they both would prefer to get EGD done at [facility] and desire inpatient transfer there if possible.  [facility] medicine team is aware.     8. As an outpatient, he will need follow-up with established GI physician, Dr.Tom Kyle which I will order in 8 to 10 weeks.      Quality-Core Measures   Reviewed items::  Labs reviewed and Medications reviewed

## 2019-07-02 NOTE — THERAPY
SPEECH THERAPY CONTACT NOTE FOR EDUCATION:     Patient seen this date for education and possible discharge education, per request from patient and wife. Patient and wife in family room on unit and with significant amount of questions regarding MBS results (wife was not present initially this afternoon), exercises, diet recommendations, foods permitted and restricted, SLP recs, POC, nature of diagnosis, prognosis, etc. Patient and wife reporting that they may transfer to [facility] tomorrow for further workup. Patient's wife had several questions and all were answered in detail, including results of MBS today, significant musculature weakness noted during study, risk for aspiration and pneumonia w/ increased fatigue, s/sx of aspiration, SLP recs, what foods were permitted and tolerated during study and continuation of full liquid diet and foods included, how to minimize fatigue, possible courses of treatment for dysphagia therapy, and handout on exercises w/ instruction of proper technique of each one. All questions were answered and extensive education provided on all topics noted.

## 2019-07-02 NOTE — DISCHARGE PLANNING
Transfer Center:    Call placed to [facility] @ [phone].  Spoke with Nabil.  Inquire about the option of patient transferring to facility via POV.  Patient concern for his out of pocket charge since insurance only covers EMERGENT transport.  [facility] ground transport:  $11,830.00.      Nabil reported that is strongly discouraged for patient to arrive POV but cannot deny acceptance to facility.  It is up to the sending MD's discretion to deem patient stable for POV transport.  Once MD and patient agree for POV transport will need to contact accepting MD, Dr. Zamzam Simon to be informed of this matter.  Nabil also stated that if patient discharge here then drive to their ER possibility of being stuck with the ED bill.    Call placed to attending MD team, Dr. Daina Linares.  Updated on status.  She stated will discuss with patient and wife.  Will call back once decision is made.    Call placed to bedside RN, Shiva.  Updated on status.  He will inform patient.    Plan:  Pending call back from MD once decision is made.

## 2019-07-02 NOTE — DISCHARGE PLANNING
Transfer Center:    Signed Transfer Back Agreement received from unit and faxed to [facility] ([phone]).  Fax confirmation received [phone].    Plan:  Awaiting acceptance and bed.

## 2019-07-02 NOTE — THERAPY
"Speech Language Therapy dysphagia treatment completed.   Functional Status:  Patient seen this date for dysphagia tx session, per his request. Patient reported he has been tolerating full liquid diet and is requesting upgraded foods d/t continued concerns for nutrition and reporting that he had soft solids at home prior to admit. Patient consumed PO trials of soft solids and thin liquids via cup sip. Patient presented with c/o globus, again at the level of the upper thoracic region and required thin liquid wash to clear, which is consistent with noted unknown obstruction on MBS yesterday. No other overt s/sx of aspiration were noted during PO trials. At this time, recommend patient upgrade to Dys2/thin liquid diet w/ strict use of swallow precautions and close monitoring for fatigue. Patient appears to follow these independently. Patient was given education regarding discontinuing PO intake of soft solids if fatigue or s/sx of aspiration increase and he verbalized understanding. Strongly recommend further imaging of lower cervical/upper thoracic region to possibly determine etiology of obstruction noted on MBS that is consistent w/ patient's c/o globus during PO intake. RN aware. SLP is following.     Recommendations: 1.) At this time, recommend patient upgrade to Dys2/thin liquid diet w/ strict use of swallow precautions and close monitoring for fatigue.   2.) Strongly recommend further imaging of lower cervical/upper thoracic region to possibly determine etiology of obstruction noted on MBS that is consistent w/ patient's c/o globus during PO intake.   Plan of Care: Will benefit from Speech Therapy 3 times per week  Post-Acute Therapy: Recommend inpatient transitional care services for continued speech therapy services.      See \"Rehab Therapy-Acute\" Patient Summary Report for complete documentation.     "

## 2019-07-02 NOTE — PROGRESS NOTES
Received bedside report and assumed pt care at 0700. Pt resting with no signs of acute distress. VSS. A&Ox4. Assessment complete, orders reviewed, labs reviewed. Updated whiteboard and discussed plan of care with pt. Bed alarm on. Bed locked and in lowest position. Fall precautions in place. Call light within reach. No further needs at this time. Hourly rounding in place.

## 2019-07-02 NOTE — CARE PLAN
Problem: Safety  Goal: Will remain free from injury  Call light and belongings are within reach.     Problem: Infection  Goal: Will remain free from infection  Frequent hand hygiene used in patient care.

## 2021-03-15 DIAGNOSIS — Z23 NEED FOR VACCINATION: ICD-10-CM

## 2021-11-29 ENCOUNTER — OFFICE VISIT (OUTPATIENT)
Dept: MEDICAL GROUP | Facility: OTHER | Age: 61
End: 2021-11-29
Payer: COMMERCIAL

## 2021-11-29 VITALS
HEART RATE: 78 BPM | TEMPERATURE: 97.6 F | SYSTOLIC BLOOD PRESSURE: 140 MMHG | HEIGHT: 69 IN | BODY MASS INDEX: 25.18 KG/M2 | RESPIRATION RATE: 15 BRPM | DIASTOLIC BLOOD PRESSURE: 80 MMHG | WEIGHT: 170 LBS | OXYGEN SATURATION: 94 %

## 2021-11-29 DIAGNOSIS — M54.50 ACUTE RIGHT-SIDED LOW BACK PAIN WITHOUT SCIATICA: Primary | ICD-10-CM

## 2021-11-29 PROCEDURE — 99213 OFFICE O/P EST LOW 20 MIN: CPT | Performed by: FAMILY MEDICINE

## 2021-11-29 RX ORDER — OMEPRAZOLE 20 MG/1
20 CAPSULE, DELAYED RELEASE ORAL
COMMUNITY
End: 2021-11-29

## 2021-11-29 RX ORDER — OMEPRAZOLE 20 MG/1
CAPSULE, DELAYED RELEASE ORAL
COMMUNITY
Start: 2019-05-23 | End: 2022-07-22

## 2021-11-29 RX ORDER — LEVOTHYROXINE SODIUM 0.03 MG/1
1 TABLET ORAL
COMMUNITY
Start: 2021-08-18 | End: 2023-12-15 | Stop reason: SDUPTHER

## 2021-11-29 RX ORDER — PREDNISONE 20 MG/1
TABLET ORAL
COMMUNITY
Start: 2019-06-21 | End: 2021-11-29

## 2021-11-29 RX ORDER — LEVOTHYROXINE SODIUM 0.03 MG/1
TABLET ORAL
COMMUNITY
Start: 2021-10-29 | End: 2021-11-29

## 2021-11-29 ASSESSMENT — ENCOUNTER SYMPTOMS
TINGLING: 1
BACK PAIN: 1
MYALGIAS: 1

## 2021-11-29 NOTE — PROGRESS NOTES
Subjective:   CC:   Chief Complaint   Patient presents with   • Back Pain     LOWER RT SIDE BACK        HPI: Mauricio is a 61 y.o. male who presents today for the following problems:    Problem   Acute Right-Sided Low Back Pain Without Sciatica        Patient reports injury to back about 4 weeks ago  - flexeril helped reduced spasm  - now on tylenol/advil, some help  - symptoms worsened with sitting/driving  - feels pain more so on right side of low back   - no pain on left lower back  - sensation change for the skin on right thigh and left thigh  - no pain in buttocks/down the back of the leg    Current Outpatient Medications   Medication Sig Dispense Refill   • levothyroxine (SYNTHROID) 25 MCG Tab Take 1 Tablet by mouth every morning before breakfast.     • omeprazole (PRILOSEC) 20 MG delayed-release capsule OMEPRAZOLE 20 MG CPDR     • cyclobenzaprine (FLEXERIL) 5 MG tablet Take 1-2 Tabs by mouth 3 times a day as needed. 30 Tab 0   • predniSONE (DELTASONE) 20 MG Tab Take 3 Tabs by mouth every day. 90 Tab 0   • sucralfate (CARAFATE) 1 GM/10ML Suspension Take 10 mL by mouth 3 times a day before meals. 36 mL 0     No current facility-administered medications for this visit.       Social History     Socioeconomic History   • Marital status:      Spouse name: Not on file   • Number of children: Not on file   • Years of education: Not on file   • Highest education level: Not on file   Occupational History   • Not on file   Tobacco Use   • Smoking status: Never Smoker   • Smokeless tobacco: Never Used   Vaping Use   • Vaping Use: Never used   Substance and Sexual Activity   • Alcohol use: No     Alcohol/week: 1.2 oz     Types: 1 Glasses of wine, 1 Cans of beer per week   • Drug use: No   • Sexual activity: Yes     Partners: Female   Other Topics Concern   • Not on file   Social History Narrative   • Not on file     Social Determinants of Health     Financial Resource Strain:    • Difficulty of Paying Living Expenses:  "Not on file   Food Insecurity:    • Worried About Running Out of Food in the Last Year: Not on file   • Ran Out of Food in the Last Year: Not on file   Transportation Needs:    • Lack of Transportation (Medical): Not on file   • Lack of Transportation (Non-Medical): Not on file   Physical Activity:    • Days of Exercise per Week: Not on file   • Minutes of Exercise per Session: Not on file   Stress:    • Feeling of Stress : Not on file   Social Connections:    • Frequency of Communication with Friends and Family: Not on file   • Frequency of Social Gatherings with Friends and Family: Not on file   • Attends Yarsanism Services: Not on file   • Active Member of Clubs or Organizations: Not on file   • Attends Club or Organization Meetings: Not on file   • Marital Status: Not on file   Intimate Partner Violence:    • Fear of Current or Ex-Partner: Not on file   • Emotionally Abused: Not on file   • Physically Abused: Not on file   • Sexually Abused: Not on file   Housing Stability:    • Unable to Pay for Housing in the Last Year: Not on file   • Number of Places Lived in the Last Year: Not on file   • Unstable Housing in the Last Year: Not on file       Review of Systems   Musculoskeletal: Positive for back pain and myalgias.   Neurological: Positive for tingling (anterior lateral thigh bilaterally).       Objective:     Vitals:    11/29/21 0848   BP: 140/80   BP Location: Left arm   Patient Position: Sitting   BP Cuff Size: Adult   Pulse: 78   Resp: 15   Temp: 36.4 °C (97.6 °F)   TempSrc: Temporal   SpO2: 94%   Weight: 77.1 kg (170 lb)   Height: 1.753 m (5' 9\")     Body mass index is 25.1 kg/m².     Physical Exam:   Gen: Well developed, well nourished in no acute distress.   Skin: Pink, warm, and dry  HEENT: conjunctiva non-injected  Oral mucous membranes pink and moist with no lesions.  Neck: Supple  Ext: no edema  Alert and oriented Eye contact is good, speech goal directed, affect calm    Back Exam     Tenderness "   The patient is experiencing tenderness in the lumbar and sacroiliac.    Range of Motion   The patient has normal back ROM.    Muscle Strength   The patient has normal back strength.  Right Quadriceps:  5/5   Left Quadriceps:  5/5   Right Hamstrings:  5/5   Left Hamstrings:  5/5     Tests   Straight leg raise right: negative  Straight leg raise left: negative    Reflexes   Patellar: normal  Achilles: normal    Other   Toe walk: normal  Heel walk: normal  Sensation: normal  Gait: normal   Erythema: no back redness    Comments:  TTP along the right lateral low back/lateral right QL. Negative NATALIYA/FADER.              Assessment & Plan:   1. Acute right-sided low back pain without sciatica  Acute, symptomatic. New to this provider. Patient with right lower back pain, negative straight leg raise bilaterally, negative NATALIYA/FADER. Strength 5/5. ROM normal. TTP along the right lateral QL. 14 months post treatment for spine surgery. Still getting treatment for dermatomyositis. Suspect muscle spasm of right lower back. Continue NSAID use as needed for pain and swelling. Will send patient to physical therapy for further treatment, return in 4-6 weeks to follow-up, if worse then will consider MRI/X-ray of low back.     Followup: Return in about 6 weeks (around 1/10/2022) for Follow-up.    Chun Sheikh D.O.    This patient was seen and staffed with the attending physician, Dr. Cleaning. Our note incorporates all medical decision making completed with the patient.       Please note that this dictation was created using voice recognition software. I have made every reasonable attempt to correct obvious errors, but I expect that there are errors of grammar and possibly content that I did not discover before finalizing the note.

## 2021-12-30 ENCOUNTER — OFFICE VISIT (OUTPATIENT)
Dept: MEDICAL GROUP | Facility: OTHER | Age: 61
End: 2021-12-30
Payer: COMMERCIAL

## 2021-12-30 VITALS
HEART RATE: 93 BPM | TEMPERATURE: 99.3 F | BODY MASS INDEX: 25.1 KG/M2 | RESPIRATION RATE: 16 BRPM | DIASTOLIC BLOOD PRESSURE: 78 MMHG | WEIGHT: 170 LBS | OXYGEN SATURATION: 93 % | SYSTOLIC BLOOD PRESSURE: 133 MMHG

## 2021-12-30 DIAGNOSIS — G57.01 PIRIFORMIS SYNDROME, RIGHT: ICD-10-CM

## 2021-12-30 PROCEDURE — 99213 OFFICE O/P EST LOW 20 MIN: CPT | Performed by: FAMILY MEDICINE

## 2021-12-30 ASSESSMENT — ENCOUNTER SYMPTOMS
GASTROINTESTINAL NEGATIVE: 1
CARDIOVASCULAR NEGATIVE: 1
CONSTITUTIONAL NEGATIVE: 1
EYES NEGATIVE: 1
NEUROLOGICAL NEGATIVE: 1
RESPIRATORY NEGATIVE: 1
PSYCHIATRIC NEGATIVE: 1

## 2021-12-30 NOTE — ASSESSMENT & PLAN NOTE
We will go it and recommend that he continue going to physical therapy.  We will go to write for some more visits.  Recommend that he take his over-the-counter NSAIDs as needed and Tylenol also as needed.  Most important thing he can do is do his physical therapy and continue with his exercise.  Follow-up in 2 to 3 months sooner as needed

## 2021-12-30 NOTE — PROGRESS NOTES
Subjective:   CC:   Chief Complaint   Patient presents with   • Back Pain     Follow up       HPI: Mauricio is a 61 y.o. male who presents today for the following problems:    Mauricio is very well-known to me coming in today for follow-up on his low back pain.  We at the time of the last visit diagnosed him with piriformis syndrome.  He has started therapy which has helped and has noticed that when he is exercising his pain actually dissipates and goes away.  He also states that he has noticed a difference since he started his physical therapy.  He is able to sleep on that side for a little bit until he starts to get numbness again then he will roll over to the side.  He is asking for more physical therapy if possible.    Problem   Piriformis Syndrome, Right       Current Outpatient Medications   Medication Sig Dispense Refill   • levothyroxine (SYNTHROID) 25 MCG Tab Take 1 Tablet by mouth every morning before breakfast.     • omeprazole (PRILOSEC) 20 MG delayed-release capsule OMEPRAZOLE 20 MG CPDR     • predniSONE (DELTASONE) 20 MG Tab Take 3 Tabs by mouth every day. 90 Tab 0   • sucralfate (CARAFATE) 1 GM/10ML Suspension Take 10 mL by mouth 3 times a day before meals. 36 mL 0   • cyclobenzaprine (FLEXERIL) 5 MG tablet Take 1-2 Tabs by mouth 3 times a day as needed. 30 Tab 0     No current facility-administered medications for this visit.       Social History     Tobacco Use   • Smoking status: Never Smoker   • Smokeless tobacco: Never Used   Vaping Use   • Vaping Use: Never used   Substance Use Topics   • Alcohol use: No     Alcohol/week: 1.2 oz     Types: 1 Glasses of wine, 1 Cans of beer per week   • Drug use: No       Review of Systems   Constitutional: Negative.    HENT: Negative.    Eyes: Negative.    Respiratory: Negative.    Cardiovascular: Negative.    Gastrointestinal: Negative.    Skin: Negative.    Neurological: Negative.    Psychiatric/Behavioral: Negative.          Objective:     Vitals:    12/30/21 0905    BP: 133/78   Pulse: 93   Resp: 16   Temp: 37.4 °C (99.3 °F)   SpO2: 93%   Weight: 77.1 kg (170 lb)     Body mass index is 25.1 kg/m².     Physical Exam  Vitals reviewed.   Constitutional:       Appearance: Normal appearance.   HENT:      Head: Normocephalic and atraumatic.   Musculoskeletal:      Comments: His SI joints are nontender to palpation he has tenderness palpation deeply to the piriformis on the right-hand side.  Patient also has a slight Trendelenburg when standing on his right.   Neurological:      Mental Status: He is alert.          Assessment & Plan:   Piriformis syndrome, right  We will go it and recommend that he continue going to physical therapy.  We will go to write for some more visits.  Recommend that he take his over-the-counter NSAIDs as needed and Tylenol also as needed.  Most important thing he can do is do his physical therapy and continue with his exercise.  Follow-up in 2 to 3 months sooner as needed      Followup: Return in about 2 months (around 2/28/2022), or if symptoms worsen or fail to improve.    Jaron Cleaning M.D.    Please note that this dictation was created using voice recognition software. I have made every reasonable attempt to correct obvious errors, but I expect that there are errors of grammar and possibly content that I did not discover before finalizing the note.

## 2022-02-03 ENCOUNTER — OFFICE VISIT (OUTPATIENT)
Dept: MEDICAL GROUP | Facility: OTHER | Age: 62
End: 2022-02-03
Payer: COMMERCIAL

## 2022-02-03 VITALS
OXYGEN SATURATION: 98 % | BODY MASS INDEX: 25.1 KG/M2 | HEIGHT: 69 IN | SYSTOLIC BLOOD PRESSURE: 132 MMHG | WEIGHT: 169.5 LBS | RESPIRATION RATE: 16 BRPM | DIASTOLIC BLOOD PRESSURE: 80 MMHG | TEMPERATURE: 99.1 F | HEART RATE: 78 BPM

## 2022-02-03 DIAGNOSIS — M62.830 BACK MUSCLE SPASM: ICD-10-CM

## 2022-02-03 PROCEDURE — 99213 OFFICE O/P EST LOW 20 MIN: CPT | Performed by: FAMILY MEDICINE

## 2022-02-03 RX ORDER — CYCLOBENZAPRINE HCL 5 MG
5-10 TABLET ORAL 3 TIMES DAILY PRN
Qty: 30 TABLET | Refills: 0 | Status: SHIPPED | OUTPATIENT
Start: 2022-02-03 | End: 2022-02-13

## 2022-02-03 ASSESSMENT — ENCOUNTER SYMPTOMS
PSYCHIATRIC NEGATIVE: 1
NEUROLOGICAL NEGATIVE: 1
GASTROINTESTINAL NEGATIVE: 1
RESPIRATORY NEGATIVE: 1
CONSTITUTIONAL NEGATIVE: 1
CARDIOVASCULAR NEGATIVE: 1
EYES NEGATIVE: 1

## 2022-02-03 ASSESSMENT — PATIENT HEALTH QUESTIONNAIRE - PHQ9: CLINICAL INTERPRETATION OF PHQ2 SCORE: 0

## 2022-02-03 NOTE — ASSESSMENT & PLAN NOTE
90% recovery is great - I will prescribe him some flexeril to use as he needs to to prevent this from getting bad - expect that he will not need another one fu as needed

## 2022-02-03 NOTE — PROGRESS NOTES
Subjective:   CC:   Chief Complaint   Patient presents with   • Follow-Up     pt is here to f/u on his rt hip pain       HPI: Mauricio is a 61 y.o. male who presents today for the following problems:    Problem   Back Muscle Spasm    Pt states that he is here for fu on back spasms - and piriformis syndrome - states that he went to therapy and is now about 90% recovered - states that when he is really active (which he is frequently) he gets the spasms and the piriformis pain which he states he is able to control - states however that once in a while he will really tighten up and has had luck in the past with with flexeril every so often - wondering if I might be willing to prescribe him some          Current Outpatient Medications   Medication Sig Dispense Refill   • cyclobenzaprine (FLEXERIL) 5 mg tablet Take 1-2 Tablets by mouth 3 times a day as needed for up to 10 days. 30 Tablet 0   • levothyroxine (SYNTHROID) 25 MCG Tab Take 1 Tablet by mouth every morning before breakfast.     • omeprazole (PRILOSEC) 20 MG delayed-release capsule OMEPRAZOLE 20 MG CPDR     • predniSONE (DELTASONE) 20 MG Tab Take 3 Tabs by mouth every day. (Patient not taking: Reported on 2/3/2022) 90 Tab 0   • sucralfate (CARAFATE) 1 GM/10ML Suspension Take 10 mL by mouth 3 times a day before meals. (Patient not taking: Reported on 2/3/2022) 36 mL 0     No current facility-administered medications for this visit.       Social History     Tobacco Use   • Smoking status: Never Smoker   • Smokeless tobacco: Never Used   Vaping Use   • Vaping Use: Never used   Substance Use Topics   • Alcohol use: No     Alcohol/week: 1.2 oz     Types: 1 Glasses of wine, 1 Cans of beer per week   • Drug use: No       Review of Systems   Constitutional: Negative.    HENT: Negative.    Eyes: Negative.    Respiratory: Negative.    Cardiovascular: Negative.    Gastrointestinal: Negative.    Skin: Negative.    Neurological: Negative.    Psychiatric/Behavioral: Negative.       "    Objective:     Vitals:    02/03/22 0903   BP: 132/80   BP Location: Right arm   Patient Position: Sitting   BP Cuff Size: Adult   Pulse: 78   Resp: 16   Temp: 37.3 °C (99.1 °F)   SpO2: 98%   Weight: 76.9 kg (169 lb 8 oz)   Height: 1.753 m (5' 9\")     Body mass index is 25.03 kg/m².     Physical Exam  Vitals reviewed.   Constitutional:       Appearance: Normal appearance. He is normal weight.   HENT:      Head: Normocephalic and atraumatic.   Musculoskeletal:         General: Normal range of motion.   Neurological:      General: No focal deficit present.      Mental Status: He is alert and oriented to person, place, and time. Mental status is at baseline.   Psychiatric:         Mood and Affect: Mood normal.         Behavior: Behavior normal.          Assessment & Plan:   Back muscle spasm  90% recovery is great - I will prescribe him some flexeril to use as he needs to to prevent this from getting bad - expect that he will not need another one fu as needed       Followup: No follow-ups on file.    Jaron Cleaning M.D.    Please note that this dictation was created using voice recognition software. I have made every reasonable attempt to correct obvious errors, but I expect that there are errors of grammar and possibly content that I did not discover before finalizing the note.  "

## 2022-02-11 DIAGNOSIS — M62.830 BACK MUSCLE SPASM: ICD-10-CM

## 2022-02-11 RX ORDER — RABEPRAZOLE SODIUM 20 MG/1
1 TABLET, DELAYED RELEASE ORAL DAILY
COMMUNITY
Start: 2022-01-19

## 2022-02-11 RX ORDER — COVID-19 MOLECULAR TEST ASSAY
KIT MISCELLANEOUS
COMMUNITY
Start: 2021-12-29 | End: 2024-01-08

## 2022-02-11 RX ORDER — COVID-19 TEST SPECIMEN COLLECT
MISCELLANEOUS MISCELLANEOUS
COMMUNITY
Start: 2022-01-12 | End: 2024-01-08

## 2022-02-14 RX ORDER — CYCLOBENZAPRINE HCL 5 MG
5-10 TABLET ORAL 3 TIMES DAILY PRN
Qty: 30 TABLET | Refills: 0 | OUTPATIENT
Start: 2022-02-14 | End: 2022-02-24

## 2022-07-21 ENCOUNTER — TELEPHONE (OUTPATIENT)
Dept: MEDICAL GROUP | Facility: CLINIC | Age: 62
End: 2022-07-21

## 2022-07-21 NOTE — TELEPHONE ENCOUNTER
Caller Name: Mauricio Mac  Call Back Number: [phone]    How would the patient prefer to be contacted with a response: Phone call OK to leave a detailed message    Mauricio tested positive for COVID this afternoon. His symptoms are a head cold, nasal symptoms, and no energy. He's been taking OTC cold medicine. He's wondering if he'll need a prescription for COVID. He's 22 months post cancer treatment. He has had 3 COVID shots. His symptoms started on Monday, but he tested negative. If he does get a prescription for COVID please send to [facility] on [address]. I let him know I would forward to Dr. Gama. Does the 5 days quarantine start when symptoms started or when he tested positive?

## 2022-07-22 ENCOUNTER — TELEMEDICINE (OUTPATIENT)
Dept: MEDICAL GROUP | Facility: OTHER | Age: 62
End: 2022-07-22
Payer: COMMERCIAL

## 2022-07-22 VITALS
HEART RATE: 63 BPM | HEIGHT: 69 IN | BODY MASS INDEX: 23.99 KG/M2 | WEIGHT: 162 LBS | OXYGEN SATURATION: 97 % | DIASTOLIC BLOOD PRESSURE: 80 MMHG | SYSTOLIC BLOOD PRESSURE: 155 MMHG

## 2022-07-22 DIAGNOSIS — U07.1 COVID-19: ICD-10-CM

## 2022-07-22 PROCEDURE — 99213 OFFICE O/P EST LOW 20 MIN: CPT | Mod: GT | Performed by: FAMILY MEDICINE

## 2022-07-31 ENCOUNTER — PATIENT MESSAGE (OUTPATIENT)
Dept: MEDICAL GROUP | Facility: OTHER | Age: 62
End: 2022-07-31
Payer: COMMERCIAL

## 2022-08-03 PROBLEM — U07.1 COVID-19: Status: ACTIVE | Noted: 2022-08-03

## 2022-08-03 NOTE — ASSESSMENT & PLAN NOTE
Advised him that after 5 days he is allowed out of isolation however his wife is in the house and he will have to wear a mask in the house for the next 5 days - I recommend that they stay in separate bedrooms unless he can antigen test and the test is negative     Fu with me or the er if worse

## 2022-08-03 NOTE — PROGRESS NOTES
"Subjective:   CC:   Chief Complaint   Patient presents with   • Coronavirus Screening     Pt has tested positive, today is the 5th day since symptoms. Tuesday he felt the worst. Today feeling a little better.   Yesterday is when he tested positive, wife is negative.        HPI: Simi is a 61 y.o. male who presents today for the following problems:    Problem   Covid-19    Today is day 5 for simi   Given his hx he is at increased risk but is out of the paxlovid window   He is wondering what his next steps are ....         Current Outpatient Medications   Medication Sig Dispense Refill   • COVID-19 Specimen Collection Kit TEST AS DIRECTED TODAY     • ID NOW COVID-19 Kit TEST AS DIRECTED TODAY     • rabeprazole (ACIPHEX) 20 MG tablet Take 1 Tablet by mouth every day.     • levothyroxine (SYNTHROID) 25 MCG Tab Take 1 Tablet by mouth every morning before breakfast.       No current facility-administered medications for this visit.       Social History     Tobacco Use   • Smoking status: Never Smoker   • Smokeless tobacco: Never Used   Vaping Use   • Vaping Use: Never used   Substance Use Topics   • Alcohol use: No     Alcohol/week: 1.2 oz     Types: 1 Glasses of wine, 1 Cans of beer per week   • Drug use: No       ROS      Objective:     Vitals:    07/22/22 1110   BP: (!) 155/80   BP Location: Left arm   Patient Position: Sitting   BP Cuff Size: Adult   Pulse: 63   SpO2: 97%   Weight: 73.5 kg (162 lb)   Height: 1.753 m (5' 9\")     Body mass index is 23.92 kg/m².     Physical Exam     Assessment & Plan:   COVID-19  Advised him that after 5 days he is allowed out of isolation however his wife is in the house and he will have to wear a mask in the house for the next 5 days - I recommend that they stay in separate bedrooms unless he can antigen test and the test is negative     Fu with me or the er if worse       Followup: Return in about 2 weeks (around 8/5/2022), or if symptoms worsen or fail to improve.    Jaron Cleaning, " M.D.    Please note that this dictation was created using voice recognition software. I have made every reasonable attempt to correct obvious errors, but I expect that there are errors of grammar and possibly content that I did not discover before finalizing the note.

## 2022-08-23 ENCOUNTER — HOSPITAL ENCOUNTER (OUTPATIENT)
Dept: RADIOLOGY | Facility: MEDICAL CENTER | Age: 62
End: 2022-08-23
Attending: STUDENT IN AN ORGANIZED HEALTH CARE EDUCATION/TRAINING PROGRAM
Payer: COMMERCIAL

## 2022-08-23 ENCOUNTER — OFFICE VISIT (OUTPATIENT)
Dept: MEDICAL GROUP | Facility: CLINIC | Age: 62
End: 2022-08-23
Payer: COMMERCIAL

## 2022-08-23 VITALS — HEIGHT: 69 IN | BODY MASS INDEX: 24.53 KG/M2 | WEIGHT: 165.6 LBS

## 2022-08-23 DIAGNOSIS — T50.Z95A ADVERSE EFFECT OF VACCINE, INITIAL ENCOUNTER: ICD-10-CM

## 2022-08-23 DIAGNOSIS — R23.8 REDNESS AND SWELLING OF UPPER ARM: ICD-10-CM

## 2022-08-23 DIAGNOSIS — R03.0 WHITE COAT SYNDROME WITHOUT DIAGNOSIS OF HYPERTENSION: ICD-10-CM

## 2022-08-23 DIAGNOSIS — M79.89 REDNESS AND SWELLING OF UPPER ARM: ICD-10-CM

## 2022-08-23 PROCEDURE — 99214 OFFICE O/P EST MOD 30 MIN: CPT | Mod: GC | Performed by: STUDENT IN AN ORGANIZED HEALTH CARE EDUCATION/TRAINING PROGRAM

## 2022-08-23 PROCEDURE — 93971 EXTREMITY STUDY: CPT | Mod: LT

## 2022-08-23 RX ORDER — CEPHALEXIN 500 MG/1
500 CAPSULE ORAL 4 TIMES DAILY
Qty: 20 CAPSULE | Refills: 0 | Status: SHIPPED | OUTPATIENT
Start: 2022-08-23 | End: 2022-08-28

## 2022-08-23 RX ORDER — CLINDAMYCIN PHOSPHATE 10 MG/G
GEL TOPICAL
COMMUNITY
Start: 2022-07-14

## 2022-08-23 NOTE — PROGRESS NOTES
"Subjective:     CC: Vaccine reaction     HPI:   Mauricio presents today with    Problem   Vaccine Reaction    Acute.  Patient reports history of flu immunization approximately 4 days ago.  Following immunization he developed soreness in his left arm at injection site.  This morning he noticed redness and swelling, with some tenderness to palpation over the left antecubital region.  He has not developed any fevers, chills or other systemic symptoms.    He does have a significant past medical history of dermatomyositis, for which she is followed by immunology and dermatology.  He states he has reached out to both specialist for guidance.         White Coat Syndrome Without Diagnosis of Hypertension    Blood pressure elevated in office, initial 170s and repeat 160s.  Patient reports recurrence of this while in office, and denies experiencing chest pain, shortness of breath, headaches or dizziness.         ROS: See HPI.     Objective:     Exam:  BP (!) (P) 170/88 (BP Location: Right arm, Patient Position: Sitting, BP Cuff Size: Adult)   Pulse (P) 76   Ht 1.753 m (5' 9\")   Wt 75.1 kg (165 lb 9.6 oz)   SpO2 (P) 98%   BMI 24.45 kg/m²  Body mass index is 24.45 kg/m².    Physical Exam:  General: Pt resting in NAD, cooperative   Skin:  No cyanosis or jaundice.  Large approximately 3 to 5 cm macular papular lesion over left antecubital/bicep region.  HEENT: NC/AT. EOMI. No conjunctival injection or sclera icterus.   Lungs:  CTAB, good air movement. Non-labored.   Cardiovascular:  S1/S2 RRR   CNS:  No gross focal neurologic deficits  Psych: Appropriate mood and affect     Assessment & Plan:     61 y.o. male with the following -   1. White coat syndrome without diagnosis of hypertension  Chronic.  Patient does have elevated blood pressure in office 160s-170s systolic.  Recommend patient monitor blood pressure at home.  Provided instructions for patient to notify clinic if blood pressure remains elevated with systolic blood " pressure readings greater than 140, which would indicate need for treatment with antihypertensive medications.    2. Adverse effect of vaccine, initial encounter  3. Redness and swelling of upper arm  Acute.  Unclear if this is a reaction to immunization, though timing would be consistent with delayed vaccine induced immune reaction.  However, redness is not located directly at injection site, though this may occur from immunizations.  He does not have any systemic signs suggestive of cellulitis or DVT.  Will order upper extremity DVT study to rule out DVT.  In addition we will provide prescription for cellulitis, with instructions to only take if redness does not resolve or worsens.  In addition recommend use of over-the-counter antihistamine, given this is likely a delayed immune reaction.  Patient will also reach out to both dermatologist and immunologist who manages dermatomyositis for guidance.  Return precautions discussed.  -Keflex 500 mg 4 times daily for 5 days  -US-EXTREMITY VENOUS UPPER UNILAT LEFT; Future    Please note that this dictation was created using voice recognition software. I have made every reasonable attempt to correct obvious errors, but I expect that there are errors of grammar and possibly content that I did not discover before finalizing the note.

## 2023-01-03 ENCOUNTER — HOSPITAL ENCOUNTER (OUTPATIENT)
Dept: LAB | Facility: MEDICAL CENTER | Age: 63
End: 2023-01-03
Attending: INTERNAL MEDICINE
Payer: OTHER MISCELLANEOUS

## 2023-01-03 LAB
ALBUMIN SERPL BCP-MCNC: 4.6 G/DL (ref 3.2–4.9)
ALBUMIN/GLOB SERPL: 1.8 G/DL
ALP SERPL-CCNC: 94 U/L (ref 30–99)
ALT SERPL-CCNC: 22 U/L (ref 2–50)
ANION GAP SERPL CALC-SCNC: 9 MMOL/L (ref 7–16)
AST SERPL-CCNC: 31 U/L (ref 12–45)
BASOPHILS # BLD AUTO: 0.4 % (ref 0–1.8)
BASOPHILS # BLD: 0.03 K/UL (ref 0–0.12)
BILIRUB SERPL-MCNC: 0.2 MG/DL (ref 0.1–1.5)
BUN SERPL-MCNC: 18 MG/DL (ref 8–22)
CALCIUM ALBUM COR SERPL-MCNC: 9.4 MG/DL (ref 8.5–10.5)
CALCIUM SERPL-MCNC: 9.9 MG/DL (ref 8.5–10.5)
CHLORIDE SERPL-SCNC: 103 MMOL/L (ref 96–112)
CK SERPL-CCNC: 243 U/L (ref 0–154)
CO2 SERPL-SCNC: 26 MMOL/L (ref 20–33)
CREAT SERPL-MCNC: 1.08 MG/DL (ref 0.5–1.4)
CRP SERPL HS-MCNC: <0.3 MG/DL (ref 0–0.75)
EOSINOPHIL # BLD AUTO: 0.17 K/UL (ref 0–0.51)
EOSINOPHIL NFR BLD: 2.3 % (ref 0–6.9)
ERYTHROCYTE [DISTWIDTH] IN BLOOD BY AUTOMATED COUNT: 40.6 FL (ref 35.9–50)
ERYTHROCYTE [SEDIMENTATION RATE] IN BLOOD BY WESTERGREN METHOD: 6 MM/HOUR (ref 0–20)
GFR SERPLBLD CREATININE-BSD FMLA CKD-EPI: 78 ML/MIN/1.73 M 2
GLOBULIN SER CALC-MCNC: 2.5 G/DL (ref 1.9–3.5)
GLUCOSE SERPL-MCNC: 88 MG/DL (ref 65–99)
HCT VFR BLD AUTO: 43 % (ref 42–52)
HGB BLD-MCNC: 14 G/DL (ref 14–18)
IMM GRANULOCYTES # BLD AUTO: 0.02 K/UL (ref 0–0.11)
IMM GRANULOCYTES NFR BLD AUTO: 0.3 % (ref 0–0.9)
LDH SERPL L TO P-CCNC: 141 U/L (ref 107–266)
LYMPHOCYTES # BLD AUTO: 1.56 K/UL (ref 1–4.8)
LYMPHOCYTES NFR BLD: 21.3 % (ref 22–41)
MCH RBC QN AUTO: 26.8 PG (ref 27–33)
MCHC RBC AUTO-ENTMCNC: 32.6 G/DL (ref 33.7–35.3)
MCV RBC AUTO: 82.2 FL (ref 81.4–97.8)
MONOCYTES # BLD AUTO: 0.74 K/UL (ref 0–0.85)
MONOCYTES NFR BLD AUTO: 10.1 % (ref 0–13.4)
NEUTROPHILS # BLD AUTO: 4.81 K/UL (ref 1.82–7.42)
NEUTROPHILS NFR BLD: 65.6 % (ref 44–72)
NRBC # BLD AUTO: 0 K/UL
NRBC BLD-RTO: 0 /100 WBC
PLATELET # BLD AUTO: 282 K/UL (ref 164–446)
PMV BLD AUTO: 10 FL (ref 9–12.9)
POTASSIUM SERPL-SCNC: 4.6 MMOL/L (ref 3.6–5.5)
PROT SERPL-MCNC: 7.1 G/DL (ref 6–8.2)
RBC # BLD AUTO: 5.23 M/UL (ref 4.7–6.1)
SODIUM SERPL-SCNC: 138 MMOL/L (ref 135–145)
WBC # BLD AUTO: 7.3 K/UL (ref 4.8–10.8)

## 2023-01-03 PROCEDURE — 82085 ASSAY OF ALDOLASE: CPT

## 2023-01-03 PROCEDURE — 80053 COMPREHEN METABOLIC PANEL: CPT

## 2023-01-03 PROCEDURE — 85652 RBC SED RATE AUTOMATED: CPT

## 2023-01-03 PROCEDURE — 85025 COMPLETE CBC W/AUTO DIFF WBC: CPT

## 2023-01-03 PROCEDURE — 86140 C-REACTIVE PROTEIN: CPT

## 2023-01-03 PROCEDURE — 83615 LACTATE (LD) (LDH) ENZYME: CPT

## 2023-01-03 PROCEDURE — 36415 COLL VENOUS BLD VENIPUNCTURE: CPT

## 2023-01-03 PROCEDURE — 82550 ASSAY OF CK (CPK): CPT

## 2023-01-05 LAB — ALDOLASE SERPL-CCNC: 4.4 U/L (ref 1.2–7.6)

## 2023-06-02 ENCOUNTER — HOSPITAL ENCOUNTER (OUTPATIENT)
Dept: LAB | Facility: MEDICAL CENTER | Age: 63
End: 2023-06-02
Attending: FAMILY MEDICINE
Payer: OTHER MISCELLANEOUS

## 2023-06-02 LAB
ALBUMIN SERPL BCP-MCNC: 4.4 G/DL (ref 3.2–4.9)
ALBUMIN/GLOB SERPL: 2 G/DL
ALP SERPL-CCNC: 84 U/L (ref 30–99)
ALT SERPL-CCNC: 22 U/L (ref 2–50)
ANION GAP SERPL CALC-SCNC: 12 MMOL/L (ref 7–16)
AST SERPL-CCNC: 23 U/L (ref 12–45)
BASOPHILS # BLD AUTO: 0.4 % (ref 0–1.8)
BASOPHILS # BLD: 0.03 K/UL (ref 0–0.12)
BILIRUB SERPL-MCNC: 0.2 MG/DL (ref 0.1–1.5)
BUN SERPL-MCNC: 21 MG/DL (ref 8–22)
CALCIUM ALBUM COR SERPL-MCNC: 9 MG/DL (ref 8.5–10.5)
CALCIUM SERPL-MCNC: 9.3 MG/DL (ref 8.5–10.5)
CHLORIDE SERPL-SCNC: 104 MMOL/L (ref 96–112)
CK SERPL-CCNC: 105 U/L (ref 0–154)
CO2 SERPL-SCNC: 26 MMOL/L (ref 20–33)
CREAT SERPL-MCNC: 0.8 MG/DL (ref 0.5–1.4)
CRP SERPL HS-MCNC: <0.3 MG/DL (ref 0–0.75)
EOSINOPHIL # BLD AUTO: 0.41 K/UL (ref 0–0.51)
EOSINOPHIL NFR BLD: 5.5 % (ref 0–6.9)
ERYTHROCYTE [DISTWIDTH] IN BLOOD BY AUTOMATED COUNT: 39.9 FL (ref 35.9–50)
ERYTHROCYTE [SEDIMENTATION RATE] IN BLOOD BY WESTERGREN METHOD: 5 MM/HOUR (ref 0–20)
GFR SERPLBLD CREATININE-BSD FMLA CKD-EPI: 100 ML/MIN/1.73 M 2
GLOBULIN SER CALC-MCNC: 2.2 G/DL (ref 1.9–3.5)
GLUCOSE SERPL-MCNC: 101 MG/DL (ref 65–99)
HCT VFR BLD AUTO: 42.4 % (ref 42–52)
HGB BLD-MCNC: 13.8 G/DL (ref 14–18)
IMM GRANULOCYTES # BLD AUTO: 0.02 K/UL (ref 0–0.11)
IMM GRANULOCYTES NFR BLD AUTO: 0.3 % (ref 0–0.9)
LDH SERPL L TO P-CCNC: 159 U/L (ref 107–266)
LYMPHOCYTES # BLD AUTO: 2.1 K/UL (ref 1–4.8)
LYMPHOCYTES NFR BLD: 28 % (ref 22–41)
MCH RBC QN AUTO: 26.6 PG (ref 27–33)
MCHC RBC AUTO-ENTMCNC: 32.5 G/DL (ref 32.3–36.5)
MCV RBC AUTO: 81.9 FL (ref 81.4–97.8)
MONOCYTES # BLD AUTO: 0.8 K/UL (ref 0–0.85)
MONOCYTES NFR BLD AUTO: 10.7 % (ref 0–13.4)
NEUTROPHILS # BLD AUTO: 4.15 K/UL (ref 1.82–7.42)
NEUTROPHILS NFR BLD: 55.1 % (ref 44–72)
NRBC # BLD AUTO: 0 K/UL
NRBC BLD-RTO: 0 /100 WBC (ref 0–0.2)
PLATELET # BLD AUTO: 239 K/UL (ref 164–446)
PMV BLD AUTO: 9.6 FL (ref 9–12.9)
POTASSIUM SERPL-SCNC: 4.2 MMOL/L (ref 3.6–5.5)
PROT SERPL-MCNC: 6.6 G/DL (ref 6–8.2)
RBC # BLD AUTO: 5.18 M/UL (ref 4.7–6.1)
SODIUM SERPL-SCNC: 142 MMOL/L (ref 135–145)
WBC # BLD AUTO: 7.5 K/UL (ref 4.8–10.8)

## 2023-06-02 PROCEDURE — 82085 ASSAY OF ALDOLASE: CPT

## 2023-06-02 PROCEDURE — 85025 COMPLETE CBC W/AUTO DIFF WBC: CPT

## 2023-06-02 PROCEDURE — 82550 ASSAY OF CK (CPK): CPT

## 2023-06-02 PROCEDURE — 83615 LACTATE (LD) (LDH) ENZYME: CPT

## 2023-06-02 PROCEDURE — 80053 COMPREHEN METABOLIC PANEL: CPT

## 2023-06-02 PROCEDURE — 36415 COLL VENOUS BLD VENIPUNCTURE: CPT

## 2023-06-02 PROCEDURE — 85652 RBC SED RATE AUTOMATED: CPT

## 2023-06-02 PROCEDURE — 86140 C-REACTIVE PROTEIN: CPT

## 2023-06-03 LAB — ALDOLASE SERPL-CCNC: 3.3 U/L (ref 1.2–7.6)

## 2023-06-09 ENCOUNTER — TELEPHONE (OUTPATIENT)
Dept: MEDICAL GROUP | Facility: OTHER | Age: 63
End: 2023-06-09
Payer: COMMERCIAL

## 2023-06-09 ENCOUNTER — PATIENT MESSAGE (OUTPATIENT)
Dept: MEDICAL GROUP | Facility: OTHER | Age: 63
End: 2023-06-09
Payer: COMMERCIAL

## 2023-06-09 NOTE — TELEPHONE ENCOUNTER
Dr. Cleaning,    Here is a copy and past of the [employer] Message Mauricio sent regarding Lab Orders. He would like to get these labs ordered for follow up purposes. His number is [phone]    [facility] Dr. Cleaning,     Here is a copy of my visit notes from 6/5/23  visit with Dr. Brown ([facility]) where yearly PSA screening was recommended:     “Plan:  -- Given resolution of symptoms today, continue to remain off IVIG and monitor for any symptom recurrence.   -- has clobetasol 0.05% ointment and protopic 0.1% ointment BID PRN for rash recurrence  -- For malignancy screening, recommend age-appropriate cancer screening (PSA q12 months, next colonoscopy due 2029). Patient will discuss PSA screening further with his PCP.  -- Will check labs prior to next visit (CBC, CMP, ESR, CRP, aldolase, CK, LDH)”     I am hoping to get PSA labs today or tomorrow.

## 2023-06-13 DIAGNOSIS — N40.1 BENIGN PROSTATIC HYPERPLASIA WITH LOWER URINARY TRACT SYMPTOMS, SYMPTOM DETAILS UNSPECIFIED: ICD-10-CM

## 2023-06-14 ENCOUNTER — HOSPITAL ENCOUNTER (OUTPATIENT)
Dept: LAB | Facility: MEDICAL CENTER | Age: 63
End: 2023-06-14
Attending: FAMILY MEDICINE
Payer: OTHER MISCELLANEOUS

## 2023-06-14 DIAGNOSIS — N40.1 BENIGN PROSTATIC HYPERPLASIA WITH LOWER URINARY TRACT SYMPTOMS, SYMPTOM DETAILS UNSPECIFIED: ICD-10-CM

## 2023-06-14 PROCEDURE — 84153 ASSAY OF PSA TOTAL: CPT

## 2023-06-14 PROCEDURE — 36415 COLL VENOUS BLD VENIPUNCTURE: CPT

## 2023-06-14 PROCEDURE — 84154 ASSAY OF PSA FREE: CPT

## 2023-06-16 LAB
PSA FREE MFR SERPL: 50 %
PSA FREE SERPL-MCNC: 0.1 NG/ML
PSA SERPL-MCNC: 0.2 NG/ML (ref 0–4)

## 2023-09-20 ENCOUNTER — HOSPITAL ENCOUNTER (OUTPATIENT)
Dept: LAB | Facility: MEDICAL CENTER | Age: 63
End: 2023-09-20
Attending: STUDENT IN AN ORGANIZED HEALTH CARE EDUCATION/TRAINING PROGRAM
Payer: OTHER MISCELLANEOUS

## 2023-09-20 LAB
BASOPHILS # BLD AUTO: 0.4 % (ref 0–1.8)
BASOPHILS # BLD: 0.03 K/UL (ref 0–0.12)
EOSINOPHIL # BLD AUTO: 0.11 K/UL (ref 0–0.51)
EOSINOPHIL NFR BLD: 1.3 % (ref 0–6.9)
ERYTHROCYTE [DISTWIDTH] IN BLOOD BY AUTOMATED COUNT: 40.7 FL (ref 35.9–50)
ERYTHROCYTE [SEDIMENTATION RATE] IN BLOOD BY WESTERGREN METHOD: 6 MM/HOUR (ref 0–20)
HCT VFR BLD AUTO: 42.9 % (ref 42–52)
HGB BLD-MCNC: 13.6 G/DL (ref 14–18)
IMM GRANULOCYTES # BLD AUTO: 0.07 K/UL (ref 0–0.11)
IMM GRANULOCYTES NFR BLD AUTO: 0.9 % (ref 0–0.9)
LYMPHOCYTES # BLD AUTO: 2.17 K/UL (ref 1–4.8)
LYMPHOCYTES NFR BLD: 26.4 % (ref 22–41)
MCH RBC QN AUTO: 26 PG (ref 27–33)
MCHC RBC AUTO-ENTMCNC: 31.7 G/DL (ref 32.3–36.5)
MCV RBC AUTO: 81.9 FL (ref 81.4–97.8)
MONOCYTES # BLD AUTO: 0.72 K/UL (ref 0–0.85)
MONOCYTES NFR BLD AUTO: 8.7 % (ref 0–13.4)
NEUTROPHILS # BLD AUTO: 5.13 K/UL (ref 1.82–7.42)
NEUTROPHILS NFR BLD: 62.3 % (ref 44–72)
NRBC # BLD AUTO: 0 K/UL
NRBC BLD-RTO: 0 /100 WBC (ref 0–0.2)
PLATELET # BLD AUTO: 303 K/UL (ref 164–446)
PMV BLD AUTO: 9.6 FL (ref 9–12.9)
RBC # BLD AUTO: 5.24 M/UL (ref 4.7–6.1)
WBC # BLD AUTO: 8.2 K/UL (ref 4.8–10.8)

## 2023-09-20 PROCEDURE — 36415 COLL VENOUS BLD VENIPUNCTURE: CPT

## 2023-09-20 PROCEDURE — 85652 RBC SED RATE AUTOMATED: CPT

## 2023-09-20 PROCEDURE — 80053 COMPREHEN METABOLIC PANEL: CPT

## 2023-09-20 PROCEDURE — 82085 ASSAY OF ALDOLASE: CPT

## 2023-09-20 PROCEDURE — 82550 ASSAY OF CK (CPK): CPT

## 2023-09-20 PROCEDURE — 85025 COMPLETE CBC W/AUTO DIFF WBC: CPT

## 2023-09-20 PROCEDURE — 83615 LACTATE (LD) (LDH) ENZYME: CPT

## 2023-09-20 PROCEDURE — 86140 C-REACTIVE PROTEIN: CPT

## 2023-09-21 LAB
ALBUMIN SERPL BCP-MCNC: 4.6 G/DL (ref 3.2–4.9)
ALBUMIN/GLOB SERPL: 1.8 G/DL
ALP SERPL-CCNC: 101 U/L (ref 30–99)
ALT SERPL-CCNC: 27 U/L (ref 2–50)
ANION GAP SERPL CALC-SCNC: 13 MMOL/L (ref 7–16)
AST SERPL-CCNC: 32 U/L (ref 12–45)
BILIRUB SERPL-MCNC: 0.3 MG/DL (ref 0.1–1.5)
BUN SERPL-MCNC: 13 MG/DL (ref 8–22)
CALCIUM ALBUM COR SERPL-MCNC: 8.6 MG/DL (ref 8.5–10.5)
CALCIUM SERPL-MCNC: 9.1 MG/DL (ref 8.5–10.5)
CHLORIDE SERPL-SCNC: 100 MMOL/L (ref 96–112)
CK SERPL-CCNC: 194 U/L (ref 0–154)
CO2 SERPL-SCNC: 25 MMOL/L (ref 20–33)
CREAT SERPL-MCNC: 1.07 MG/DL (ref 0.5–1.4)
CRP SERPL HS-MCNC: <0.3 MG/DL (ref 0–0.75)
GFR SERPLBLD CREATININE-BSD FMLA CKD-EPI: 78 ML/MIN/1.73 M 2
GLOBULIN SER CALC-MCNC: 2.6 G/DL (ref 1.9–3.5)
GLUCOSE SERPL-MCNC: 92 MG/DL (ref 65–99)
LDH SERPL L TO P-CCNC: 151 U/L (ref 107–266)
POTASSIUM SERPL-SCNC: 4.4 MMOL/L (ref 3.6–5.5)
PROT SERPL-MCNC: 7.2 G/DL (ref 6–8.2)
SODIUM SERPL-SCNC: 138 MMOL/L (ref 135–145)

## 2023-09-22 LAB — ALDOLASE SERPL-CCNC: 6.4 U/L (ref 1.2–7.6)

## 2023-09-25 ENCOUNTER — NON-PROVIDER VISIT (OUTPATIENT)
Dept: MEDICAL GROUP | Facility: CLINIC | Age: 63
End: 2023-09-25
Payer: COMMERCIAL

## 2023-12-13 ENCOUNTER — OFFICE VISIT (OUTPATIENT)
Dept: MEDICAL GROUP | Facility: OTHER | Age: 63
End: 2023-12-13
Payer: COMMERCIAL

## 2023-12-13 VITALS
TEMPERATURE: 98 F | WEIGHT: 167 LBS | HEIGHT: 69 IN | OXYGEN SATURATION: 98 % | HEART RATE: 75 BPM | BODY MASS INDEX: 24.73 KG/M2 | SYSTOLIC BLOOD PRESSURE: 152 MMHG | DIASTOLIC BLOOD PRESSURE: 78 MMHG

## 2023-12-13 DIAGNOSIS — Z00.00 ENCOUNTER FOR ANNUAL HEALTH EXAMINATION: Primary | ICD-10-CM

## 2023-12-13 DIAGNOSIS — R03.0 WHITE COAT SYNDROME WITHOUT DIAGNOSIS OF HYPERTENSION: ICD-10-CM

## 2023-12-13 DIAGNOSIS — E03.9 HYPOTHYROIDISM, UNSPECIFIED TYPE: ICD-10-CM

## 2023-12-13 DIAGNOSIS — Z23 ENCOUNTER FOR VACCINATION: ICD-10-CM

## 2023-12-13 PROCEDURE — 99386 PREV VISIT NEW AGE 40-64: CPT | Mod: 25,GC | Performed by: STUDENT IN AN ORGANIZED HEALTH CARE EDUCATION/TRAINING PROGRAM

## 2023-12-13 PROCEDURE — 90677 PCV20 VACCINE IM: CPT | Performed by: STUDENT IN AN ORGANIZED HEALTH CARE EDUCATION/TRAINING PROGRAM

## 2023-12-13 PROCEDURE — 90471 IMMUNIZATION ADMIN: CPT | Performed by: STUDENT IN AN ORGANIZED HEALTH CARE EDUCATION/TRAINING PROGRAM

## 2023-12-13 PROCEDURE — 3077F SYST BP >= 140 MM HG: CPT | Performed by: STUDENT IN AN ORGANIZED HEALTH CARE EDUCATION/TRAINING PROGRAM

## 2023-12-13 PROCEDURE — 3078F DIAST BP <80 MM HG: CPT | Performed by: STUDENT IN AN ORGANIZED HEALTH CARE EDUCATION/TRAINING PROGRAM

## 2023-12-13 RX ORDER — FLUOROURACIL 50 MG/G
1 CREAM TOPICAL
COMMUNITY
Start: 2023-07-26 | End: 2024-01-08

## 2023-12-13 ASSESSMENT — FIBROSIS 4 INDEX: FIB4 SCORE: 1.26

## 2023-12-13 NOTE — ASSESSMENT & PLAN NOTE
Discussed continuing to monitor blood pressure at home. Will have patient bring blood pressure cuff to next visit or to pharmacy to ensure cuff is calibrated correctly. Follow up as needed pending calibration results.

## 2023-12-13 NOTE — PROGRESS NOTES
Subjective:     CC:   Chief Complaint   Patient presents with    Annual Exam     No concerns       HPI:   Mauricio Mac is a 63 y.o. 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    # Skin   Treated with fluorouracil topically for SCC by dermatologist. Denies any recent skin abnormalities or recurrence of dermatomyositis symptoms.     # Dysphagia  # Xerostomia  Doing well, conditions stable. Follows closely with dentist for management of xerostomia.     # Elevated blood pressure  Reports hx of white coat hypertension. Home BP ranges 120s/70s. Has not had cuff calibrated.     # Hypothyroid  Compliant with medication. No concerning symptoms. Due for TSH check.     Health Maintenance  Cholesterol Screening:due; ordered today   Diabetes Screening: due; ordered today  Diet: More plant based in past few months, cutting down on red meat, more fish and veggies.   Exercise: Gets regular exercise. >150 minutes of exercise a week. Brings TRX straps on trips to stay active.   Substance Abuse: rare alcohol use (1 drink once a month). No illicit substance use.   Safe in relationship.   Seat belts discussed.   Sun protection used.  Dentist- sees dentist regularly, manages xerostomia.     Cancer screening  Colorectal Cancer Screening: Last colonoscopy a few years ago. Not due for screening at this time.   PSA: 0.2 6/14/23. No urinary symptoms.   Hx of SCC- Sees dermatologist annually.     Infectious disease screening/Immunizations  --Hepatitis C Screening: completed 2019  --Immunizations: Received PCV23 in 2000, given hx of malignancy, will recommend PCV15.       He  has a past medical history of Back spasm, Dermatomyositis (HCC), GERD (gastroesophageal reflux disease), Hiatal hernia, and Rupture of right plantaris tendon.  He  has a past surgical  history that includes inguinal hernia repair (Right, 2004); shoulder arthroscopy (Right, 1994); phlebectomy (Bilateral, 2009); and other (2006).  Family History   Problem Relation Age of Onset    Cancer Mother         Melanoma 40s    Other Father 55        primary amyloidosis- not familial type    Cancer Maternal Grandmother         colon cancer- around 80s    Heart Attack Maternal Grandfather         around 60s    ADD / ADHD Maternal Grandfather     Diabetes Paternal Grandmother         lives to 80s     Social History     Tobacco Use    Smoking status: Never    Smokeless tobacco: Never   Vaping Use    Vaping Use: Never used   Substance Use Topics    Alcohol use: Not Currently     Alcohol/week: 1.2 oz     Types: 1 Glasses of wine, 1 Cans of beer per week     Comment: rarely    Drug use: No       Patient Active Problem List    Diagnosis Date Noted    Vaccine reaction 08/23/2022    COVID-19 08/03/2022    Piriformis syndrome, right 12/30/2021    Acute right-sided low back pain without sciatica 11/29/2021    White coat syndrome without diagnosis of hypertension 09/17/2018    Back muscle spasm 09/17/2018    Abnormal CXR 09/14/2018    Hyperinflation of lungs 09/14/2018    Gastroesophageal reflux disease without esophagitis 09/14/2018    Hiatal hernia 09/14/2018    Family history of cardiovascular disease 09/14/2018    Family history of melanoma 09/14/2018       Current Outpatient Medications   Medication Sig Dispense Refill    fluorouracil (EFUDEX) 5 % cream Apply 1 Application topically.      clindamycin (CLEOCIN T) 1 % Gel APPLY 1 APPLICATION ON THE SKIN TWICE A DAY      rabeprazole (ACIPHEX) 20 MG tablet Take 1 Tablet by mouth every day.      levothyroxine (SYNTHROID) 25 MCG Tab Take 1 Tablet by mouth every morning before breakfast.      benzoyl peroxide 5 % external liquid Wash affected area(s) as directed twice daily. (Patient not taking: Reported on 8/23/2022)      COVID-19 Specimen Collection Kit TEST AS DIRECTED  "TODAY      ID NOW COVID-19 Kit TEST AS DIRECTED TODAY       No current facility-administered medications for this visit.    (including changes today)  Allergies: Gabapentin, Latex, Methadone, and Amoxicillin    Review of Systems   Constitutional: Negative for fever, chills and malaise/fatigue.   HENT: Negative for congestion.    Eyes: Negative for pain.   Respiratory: Negative for cough and shortness of breath.    Cardiovascular: Negative for leg swelling.   Gastrointestinal: Negative for nausea, vomiting, abdominal pain and diarrhea.   Genitourinary: Negative for dysuria and hematuria.   Skin: Negative for rash.   Neurological: Negative for dizziness, focal weakness and headaches.   Endo/Heme/Allergies: Does not bleed easily.   Psychiatric/Behavioral: Negative for depression.  The patient is not nervous/anxious.      Objective:     BP (!) 152/78 (BP Location: Right arm, Patient Position: Sitting)   Pulse 75   Temp 36.7 °C (98 °F) (Temporal)   Ht 1.753 m (5' 9\")   Wt 75.8 kg (167 lb)   SpO2 98%   BMI 24.66 kg/m²   Body mass index is 24.66 kg/m².  Wt Readings from Last 4 Encounters:   12/13/23 75.8 kg (167 lb)   08/23/22 75.1 kg (165 lb 9.6 oz)   07/22/22 73.5 kg (162 lb)   02/03/22 76.9 kg (169 lb 8 oz)       Physical Exam:  Constitutional: Well-developed and well-nourished. Not diaphoretic. No distress.   Skin: Skin is warm and dry. No rash noted.  Head: Atraumatic without lesions.  Eyes: Conjunctivae and extraocular motions are normal. Pupils are equal, round, and reactive to light. No scleral icterus.   Ears:  External ears unremarkable. Impacted cerumen bilaterally.   Nose: Nares patent. Septum midline. Turbinates without erythema nor edema. No discharge.   Mouth/Throat: Dentition is in good repair. Tongue normal. Oropharynx is clear and moist. Posterior pharynx without erythema or exudates.  Neck: Supple, trachea midline. Normal range of motion. No thyromegaly present. No lymphadenopathy--cervical or " supraclavicular.  Cardiovascular: Regular rate and rhythm, S1 and S2 without murmur, rubs, or gallops.    Lungs: Effort normal. Clear to auscultation throughout. No adventitious sounds. No CVA tenderness.  Abdomen: Soft, non tender, and without distention. Active bowel sounds in all four quadrants. No rebound, guarding, masses or HSM.  Extremities: No cyanosis, clubbing, erythema, nor edema. Distal pulses intact and symmetric.   Musculoskeletal: All major joints AROM full in all directions without pain.  Neurological: Alert and oriented x 3.   Psychiatric:  Behavior, mood, and affect are appropriate.      Assessment and Plan:     1. Encounter for annual health examination  Lipid Profile    HEMOGLOBIN A1C      2. Hypothyroidism, unspecified type  TSH WITH REFLEX TO FT4      3. Encounter for vaccination  Pneumococcal Conjugate Vaccine 20-Valent (6 wks+)      4. White coat syndrome without diagnosis of hypertension            HCM: Screening up to date, with exception of cholesterol and DM2.   Labs per orders.  Vaccinations per orders.  Counseling about diet, supplements, exercise, skin care.     Problem List Items Addressed This Visit       White coat syndrome without diagnosis of hypertension     Discussed continuing to monitor blood pressure at home. Will have patient bring blood pressure cuff to next visit or to pharmacy to ensure cuff is calibrated correctly. Follow up as needed pending calibration results.           Other Visit Diagnoses       Encounter for annual health examination    -  Primary    Relevant Orders    Lipid Profile    HEMOGLOBIN A1C    Hypothyroidism, unspecified type        Relevant Orders    TSH WITH REFLEX TO FT4    Encounter for vaccination        Relevant Orders    Pneumococcal Conjugate Vaccine 20-Valent (6 wks+) (Completed)          Henry Posada DO  [employer] [facility] Fellow

## 2023-12-15 RX ORDER — LEVOTHYROXINE SODIUM 0.05 MG/1
50 TABLET ORAL
Qty: 90 TABLET | Refills: 0 | Status: SHIPPED | OUTPATIENT
Start: 2023-12-15 | End: 2024-01-08

## 2023-12-18 ENCOUNTER — HOSPITAL ENCOUNTER (OUTPATIENT)
Dept: LAB | Facility: MEDICAL CENTER | Age: 63
End: 2023-12-18
Attending: STUDENT IN AN ORGANIZED HEALTH CARE EDUCATION/TRAINING PROGRAM
Payer: COMMERCIAL

## 2023-12-18 DIAGNOSIS — E03.9 HYPOTHYROIDISM, UNSPECIFIED TYPE: ICD-10-CM

## 2023-12-18 DIAGNOSIS — Z00.00 ENCOUNTER FOR ANNUAL HEALTH EXAMINATION: ICD-10-CM

## 2023-12-18 LAB
CHOLEST SERPL-MCNC: 272 MG/DL (ref 100–199)
EST. AVERAGE GLUCOSE BLD GHB EST-MCNC: 120 MG/DL
HBA1C MFR BLD: 5.8 % (ref 4–5.6)
HDLC SERPL-MCNC: 49 MG/DL
LDLC SERPL CALC-MCNC: 184 MG/DL
T4 FREE SERPL-MCNC: 1.04 NG/DL (ref 0.93–1.7)
TRIGL SERPL-MCNC: 197 MG/DL (ref 0–149)
TSH SERPL DL<=0.005 MIU/L-ACNC: 7.28 UIU/ML (ref 0.38–5.33)

## 2023-12-18 PROCEDURE — 83036 HEMOGLOBIN GLYCOSYLATED A1C: CPT

## 2023-12-18 PROCEDURE — 80061 LIPID PANEL: CPT

## 2023-12-18 PROCEDURE — 84439 ASSAY OF FREE THYROXINE: CPT

## 2023-12-18 PROCEDURE — 84443 ASSAY THYROID STIM HORMONE: CPT

## 2023-12-18 PROCEDURE — 36415 COLL VENOUS BLD VENIPUNCTURE: CPT

## 2023-12-29 ENCOUNTER — HOSPITAL ENCOUNTER (OUTPATIENT)
Dept: LAB | Facility: MEDICAL CENTER | Age: 63
End: 2023-12-29
Attending: STUDENT IN AN ORGANIZED HEALTH CARE EDUCATION/TRAINING PROGRAM
Payer: OTHER MISCELLANEOUS

## 2023-12-29 LAB
ALBUMIN SERPL BCP-MCNC: 4.6 G/DL (ref 3.2–4.9)
ALBUMIN/GLOB SERPL: 2 G/DL
ALP SERPL-CCNC: 75 U/L (ref 30–99)
ALT SERPL-CCNC: 15 U/L (ref 2–50)
ANION GAP SERPL CALC-SCNC: 11 MMOL/L (ref 7–16)
AST SERPL-CCNC: 23 U/L (ref 12–45)
BASOPHILS # BLD AUTO: 0.3 % (ref 0–1.8)
BASOPHILS # BLD: 0.02 K/UL (ref 0–0.12)
BILIRUB SERPL-MCNC: 0.3 MG/DL (ref 0.1–1.5)
BUN SERPL-MCNC: 12 MG/DL (ref 8–22)
CALCIUM ALBUM COR SERPL-MCNC: 9 MG/DL (ref 8.5–10.5)
CALCIUM SERPL-MCNC: 9.5 MG/DL (ref 8.5–10.5)
CHLORIDE SERPL-SCNC: 103 MMOL/L (ref 96–112)
CK SERPL-CCNC: 251 U/L (ref 0–154)
CO2 SERPL-SCNC: 26 MMOL/L (ref 20–33)
CREAT SERPL-MCNC: 1.11 MG/DL (ref 0.5–1.4)
CRP SERPL HS-MCNC: <0.3 MG/DL (ref 0–0.75)
EOSINOPHIL # BLD AUTO: 0.13 K/UL (ref 0–0.51)
EOSINOPHIL NFR BLD: 2 % (ref 0–6.9)
ERYTHROCYTE [DISTWIDTH] IN BLOOD BY AUTOMATED COUNT: 41.6 FL (ref 35.9–50)
GFR SERPLBLD CREATININE-BSD FMLA CKD-EPI: 75 ML/MIN/1.73 M 2
GLOBULIN SER CALC-MCNC: 2.3 G/DL (ref 1.9–3.5)
GLUCOSE SERPL-MCNC: 93 MG/DL (ref 65–99)
HCT VFR BLD AUTO: 42 % (ref 42–52)
HGB BLD-MCNC: 13.5 G/DL (ref 14–18)
IMM GRANULOCYTES # BLD AUTO: 0.02 K/UL (ref 0–0.11)
IMM GRANULOCYTES NFR BLD AUTO: 0.3 % (ref 0–0.9)
LDH SERPL L TO P-CCNC: 143 U/L (ref 107–266)
LYMPHOCYTES # BLD AUTO: 1.75 K/UL (ref 1–4.8)
LYMPHOCYTES NFR BLD: 27.5 % (ref 22–41)
MCH RBC QN AUTO: 26.7 PG (ref 27–33)
MCHC RBC AUTO-ENTMCNC: 32.1 G/DL (ref 32.3–36.5)
MCV RBC AUTO: 83 FL (ref 81.4–97.8)
MONOCYTES # BLD AUTO: 0.58 K/UL (ref 0–0.85)
MONOCYTES NFR BLD AUTO: 9.1 % (ref 0–13.4)
NEUTROPHILS # BLD AUTO: 3.87 K/UL (ref 1.82–7.42)
NEUTROPHILS NFR BLD: 60.8 % (ref 44–72)
NRBC # BLD AUTO: 0 K/UL
NRBC BLD-RTO: 0 /100 WBC (ref 0–0.2)
PLATELET # BLD AUTO: 282 K/UL (ref 164–446)
PMV BLD AUTO: 10.2 FL (ref 9–12.9)
POTASSIUM SERPL-SCNC: 4.1 MMOL/L (ref 3.6–5.5)
PROT SERPL-MCNC: 6.9 G/DL (ref 6–8.2)
RBC # BLD AUTO: 5.06 M/UL (ref 4.7–6.1)
SODIUM SERPL-SCNC: 140 MMOL/L (ref 135–145)
WBC # BLD AUTO: 6.4 K/UL (ref 4.8–10.8)

## 2023-12-29 PROCEDURE — 83615 LACTATE (LD) (LDH) ENZYME: CPT

## 2023-12-29 PROCEDURE — 36415 COLL VENOUS BLD VENIPUNCTURE: CPT

## 2023-12-29 PROCEDURE — 82085 ASSAY OF ALDOLASE: CPT

## 2023-12-29 PROCEDURE — 80053 COMPREHEN METABOLIC PANEL: CPT

## 2023-12-29 PROCEDURE — 85652 RBC SED RATE AUTOMATED: CPT

## 2023-12-29 PROCEDURE — 85025 COMPLETE CBC W/AUTO DIFF WBC: CPT

## 2023-12-29 PROCEDURE — 82550 ASSAY OF CK (CPK): CPT

## 2023-12-29 PROCEDURE — 86140 C-REACTIVE PROTEIN: CPT

## 2023-12-30 LAB — ERYTHROCYTE [SEDIMENTATION RATE] IN BLOOD BY WESTERGREN METHOD: 7 MM/HOUR (ref 0–20)

## 2023-12-31 LAB — ALDOLASE SERPL-CCNC: 4.6 U/L (ref 1.2–7.6)

## 2024-01-08 ENCOUNTER — TELEMEDICINE (OUTPATIENT)
Dept: MEDICAL GROUP | Facility: CLINIC | Age: 64
End: 2024-01-08
Payer: COMMERCIAL

## 2024-01-08 DIAGNOSIS — U07.1 COVID-19: ICD-10-CM

## 2024-01-08 PROCEDURE — 99213 OFFICE O/P EST LOW 20 MIN: CPT | Mod: GT | Performed by: FAMILY MEDICINE

## 2024-01-08 RX ORDER — LEVOTHYROXINE SODIUM 0.07 MG/1
75 TABLET ORAL
COMMUNITY

## 2024-01-08 NOTE — ASSESSMENT & PLAN NOTE
Day 2 of mild covid symptoms  Hx of immunocompromise, cancer is now in remission, not on any chemo/radiation  Discussed supportive care measures (tylenol PRN, rest, hydration, isolation precautions)  Reviewed ER precautions, no indication at this time  Discussed pros/cons of paxlovid and time window.  Elected to not start medication at this time.  If he changes his mind in the next few days, he will let me know via mychart.  We did discuss a mild interaction with his levothyroxine.    We discussed listening to his body and going slow with resuming his exercise regimen, but otherwise no contraindication to exercising during covid-19 infection

## 2024-01-08 NOTE — PROGRESS NOTES
"Telemedicine Visit: Established Patient     This encounter was conducted via Zoom.   Verbal consent was obtained. Patient's identity was verified.    Subjective:     Mauricio Mac is a 63 y.o. male presenting for evaluation and management of covid-19 infection.    Has had 8 vaccines against covid and one prior covid infection in August 2022 that resolved on its own, did not require hospitalization or medication treatment.    Current infection: Symptom onset 2 days ago.  Home test for covid was positive yesterday.  Symptoms include: congestion, scratchy throat, hoarse voice, sweats, no known fever, +mild cough.  Not feeling as exhausted as the last time he had covid infection, just a little run down.  Cleared the snow on his driveway with a  yesterday.  Last time he went to the gym was 3 days ago.    No SOB and chest pain.  Has been taking tylenol    PMH:  SCC sublingual 3 years ago, in remition, no longer on chemo/radiation.  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  And is no longer symptomatic.    Current Meds:  Now on 75mcg levothyroxine  Omeprazole every other day    Social Hx:  , wife is asymptomatic and tested negative for covid thus far  No smoking  Minimal/rare ETOH   Diet: eats pretty healthy, low red meat, some chicken and fish, eating more vegetables, low sugars  Wants to know when he can resume exercise    Allergies   Allergen Reactions    Flu Virus Vaccine Rash     Redness and Swelling, went away after a couple of days    Gabapentin Unspecified     In combination with methadone    Latex Swelling     SENSITIVITY, NOT ALLERGY. SWELLING AROUND MOUTH AFTER DENTAL SURGERY BUT 3 OTHER SURGERIES WITH NO PROBLEM.  SENSITIVITY, NOT ALLERGY. (R/T POWDER THAT USED TO BE IN GLOVES) SWELLING AROUND MOUTH AFTER DENTAL SURGERY BUT 3 OTHER SURGERIES WITH NO PROBLEM.      Methadone     Amoxicillin Unspecified     Other reaction(s): Headaches  \"UNCOMFORTABLE\" FEELING, NOT AN " "ALLERGY  \"UNCOMFORTABLE\" FEELING, NOT AN ALLERGY         Current medicines (including changes today)  Current Outpatient Medications   Medication Sig Dispense Refill    levothyroxine (SYNTHROID) 75 MCG Tab Take 75 mcg by mouth every morning on an empty stomach.      clindamycin (CLEOCIN T) 1 % Gel APPLY 1 APPLICATION ON THE SKIN TWICE A DAY      rabeprazole (ACIPHEX) 20 MG tablet Take 1 Tablet by mouth every day.       No current facility-administered medications for this visit.       Patient Active Problem List    Diagnosis Date Noted    Vaccine reaction 08/23/2022    COVID-19 08/03/2022    Piriformis syndrome, right 12/30/2021    Acute right-sided low back pain without sciatica 11/29/2021    White coat syndrome without diagnosis of hypertension 09/17/2018    Back muscle spasm 09/17/2018    Abnormal CXR 09/14/2018    Hyperinflation of lungs 09/14/2018    Gastroesophageal reflux disease without esophagitis 09/14/2018    Hiatal hernia 09/14/2018    Family history of cardiovascular disease 09/14/2018    Family history of melanoma 09/14/2018       Family History   Problem Relation Age of Onset    Cancer Mother         Melanoma 40s    Other Father 55        primary amyloidosis- not familial type    Cancer Maternal Grandmother         colon cancer- around 80s    Heart Attack Maternal Grandfather         around 60s    Diabetes Paternal Grandmother         lives to 80s       He  has a past medical history of Back spasm, Dermatomyositis (HCC), GERD (gastroesophageal reflux disease), Hiatal hernia, and Rupture of right plantaris tendon.  He  has a past surgical history that includes inguinal hernia repair (Right, 2004); shoulder arthroscopy (Right, 1994); phlebectomy (Bilateral, 2009); and other (2006).       Objective:   Vitals obtained by patient:  There were no vitals taken for this visit.      Physical Exam:  Constitutional: Alert, no distress, well-groomed.  Skin: No rashes in visible areas.  Eye: Round. Conjunctiva " clear, lids normal. No icterus.   ENMT: wearing mask, mildly scratchy voice  Neck: Moves freely without apparent pain.  Respiratory: Unlabored respiratory effort, no cough or audible wheeze.  Psych: normal affect.      Assessment and Plan:     There are no diagnoses linked to this encounter.    Problem List Items Addressed This Visit       COVID-19     Day 2 of mild covid symptoms  Hx of immunocompromise, cancer is now in remission, not on any chemo/radiation  Discussed supportive care measures (tylenol PRN, rest, hydration, isolation precautions)  Reviewed ER precautions, no indication at this time  Discussed pros/cons of paxlovid and time window.  Elected to not start medication at this time.  If he changes his mind in the next few days, he will let me know via [employer].  We did discuss a mild interaction with his levothyroxine.    We discussed listening to his body and going slow with resuming his exercise regimen, but otherwise no contraindication to exercising during covid-19 infection             Follow-up: Return if symptoms worsen or fail to improve.

## 2024-01-10 ENCOUNTER — TELEPHONE (OUTPATIENT)
Dept: MEDICAL GROUP | Facility: CLINIC | Age: 64
End: 2024-01-10
Payer: COMMERCIAL

## 2024-01-10 NOTE — TELEPHONE ENCOUNTER
Phone Number Called: [phone] (home) [phone] (work)      Call outcome: Spoke to patient regarding message below.    Message: Spoke to patient about following up for test results, they had an appt. With Dr. Fernandes on the 8 about a COVID infection, updated rx's managed by oncology. Per a previous [employer] message, patient is making major diet adjustments and had their levothyroxine dosage increased to 75mcg. Will retest levothyroxine in 60 days from adjustment. Patient stated that they would like their labs redone in a couple of months to see results of diet adjustments.

## 2024-01-31 DIAGNOSIS — Z82.49 FAMILY HISTORY OF CARDIOVASCULAR DISEASE: ICD-10-CM

## 2024-01-31 DIAGNOSIS — R53.83 MALAISE AND FATIGUE: ICD-10-CM

## 2024-01-31 DIAGNOSIS — R53.81 MALAISE AND FATIGUE: ICD-10-CM

## 2024-02-08 ENCOUNTER — HOSPITAL ENCOUNTER (OUTPATIENT)
Dept: LAB | Facility: MEDICAL CENTER | Age: 64
End: 2024-02-08
Attending: FAMILY MEDICINE
Payer: COMMERCIAL

## 2024-02-08 DIAGNOSIS — R53.83 MALAISE AND FATIGUE: ICD-10-CM

## 2024-02-08 DIAGNOSIS — R53.81 MALAISE AND FATIGUE: ICD-10-CM

## 2024-02-08 DIAGNOSIS — Z82.49 FAMILY HISTORY OF CARDIOVASCULAR DISEASE: ICD-10-CM

## 2024-02-08 LAB
CHOLEST SERPL-MCNC: 209 MG/DL (ref 100–199)
EST. AVERAGE GLUCOSE BLD GHB EST-MCNC: 126 MG/DL
FASTING STATUS PATIENT QL REPORTED: NORMAL
HBA1C MFR BLD: 6 % (ref 4–5.6)
HDLC SERPL-MCNC: 46 MG/DL
LDLC SERPL CALC-MCNC: 127 MG/DL
TRIGL SERPL-MCNC: 180 MG/DL (ref 0–149)

## 2024-02-08 PROCEDURE — 80061 LIPID PANEL: CPT

## 2024-02-08 PROCEDURE — 83036 HEMOGLOBIN GLYCOSYLATED A1C: CPT

## 2024-02-08 PROCEDURE — 36415 COLL VENOUS BLD VENIPUNCTURE: CPT

## 2024-03-11 ENCOUNTER — HOSPITAL ENCOUNTER (OUTPATIENT)
Dept: LAB | Facility: MEDICAL CENTER | Age: 64
End: 2024-03-11
Attending: STUDENT IN AN ORGANIZED HEALTH CARE EDUCATION/TRAINING PROGRAM
Payer: OTHER MISCELLANEOUS

## 2024-03-11 LAB
ALBUMIN SERPL BCP-MCNC: 4.7 G/DL (ref 3.2–4.9)
ALBUMIN/GLOB SERPL: 1.7 G/DL
ALP SERPL-CCNC: 101 U/L (ref 30–99)
ALT SERPL-CCNC: 18 U/L (ref 2–50)
ANION GAP SERPL CALC-SCNC: 12 MMOL/L (ref 7–16)
AST SERPL-CCNC: 27 U/L (ref 12–45)
BASOPHILS # BLD AUTO: 0.3 % (ref 0–1.8)
BASOPHILS # BLD: 0.02 K/UL (ref 0–0.12)
BILIRUB SERPL-MCNC: 0.4 MG/DL (ref 0.1–1.5)
BUN SERPL-MCNC: 16 MG/DL (ref 8–22)
CALCIUM ALBUM COR SERPL-MCNC: 9.3 MG/DL (ref 8.5–10.5)
CALCIUM SERPL-MCNC: 9.9 MG/DL (ref 8.5–10.5)
CHLORIDE SERPL-SCNC: 101 MMOL/L (ref 96–112)
CK SERPL-CCNC: 195 U/L (ref 0–154)
CO2 SERPL-SCNC: 25 MMOL/L (ref 20–33)
CREAT SERPL-MCNC: 1.18 MG/DL (ref 0.5–1.4)
CRP SERPL HS-MCNC: <0.3 MG/DL (ref 0–0.75)
EOSINOPHIL # BLD AUTO: 0.06 K/UL (ref 0–0.51)
EOSINOPHIL NFR BLD: 0.8 % (ref 0–6.9)
ERYTHROCYTE [DISTWIDTH] IN BLOOD BY AUTOMATED COUNT: 40.6 FL (ref 35.9–50)
ERYTHROCYTE [SEDIMENTATION RATE] IN BLOOD BY WESTERGREN METHOD: 10 MM/HOUR (ref 0–20)
GFR SERPLBLD CREATININE-BSD FMLA CKD-EPI: 70 ML/MIN/1.73 M 2
GLOBULIN SER CALC-MCNC: 2.8 G/DL (ref 1.9–3.5)
GLUCOSE SERPL-MCNC: 120 MG/DL (ref 65–99)
HCT VFR BLD AUTO: 42.2 % (ref 42–52)
HGB BLD-MCNC: 14.1 G/DL (ref 14–18)
IMM GRANULOCYTES # BLD AUTO: 0.02 K/UL (ref 0–0.11)
IMM GRANULOCYTES NFR BLD AUTO: 0.3 % (ref 0–0.9)
LDH SERPL L TO P-CCNC: 172 U/L (ref 107–266)
LYMPHOCYTES # BLD AUTO: 1.64 K/UL (ref 1–4.8)
LYMPHOCYTES NFR BLD: 21.8 % (ref 22–41)
MCH RBC QN AUTO: 27.3 PG (ref 27–33)
MCHC RBC AUTO-ENTMCNC: 33.4 G/DL (ref 32.3–36.5)
MCV RBC AUTO: 81.8 FL (ref 81.4–97.8)
MONOCYTES # BLD AUTO: 0.57 K/UL (ref 0–0.85)
MONOCYTES NFR BLD AUTO: 7.6 % (ref 0–13.4)
NEUTROPHILS # BLD AUTO: 5.21 K/UL (ref 1.82–7.42)
NEUTROPHILS NFR BLD: 69.2 % (ref 44–72)
NRBC # BLD AUTO: 0 K/UL
NRBC BLD-RTO: 0 /100 WBC (ref 0–0.2)
PLATELET # BLD AUTO: 261 K/UL (ref 164–446)
PMV BLD AUTO: 10.7 FL (ref 9–12.9)
POTASSIUM SERPL-SCNC: 4.2 MMOL/L (ref 3.6–5.5)
PROT SERPL-MCNC: 7.5 G/DL (ref 6–8.2)
RBC # BLD AUTO: 5.16 M/UL (ref 4.7–6.1)
SODIUM SERPL-SCNC: 138 MMOL/L (ref 135–145)
WBC # BLD AUTO: 7.5 K/UL (ref 4.8–10.8)

## 2024-03-11 PROCEDURE — 85652 RBC SED RATE AUTOMATED: CPT

## 2024-03-11 PROCEDURE — 80053 COMPREHEN METABOLIC PANEL: CPT

## 2024-03-11 PROCEDURE — 36415 COLL VENOUS BLD VENIPUNCTURE: CPT

## 2024-03-11 PROCEDURE — 82550 ASSAY OF CK (CPK): CPT

## 2024-03-11 PROCEDURE — 83615 LACTATE (LD) (LDH) ENZYME: CPT

## 2024-03-11 PROCEDURE — 82085 ASSAY OF ALDOLASE: CPT

## 2024-03-11 PROCEDURE — 85025 COMPLETE CBC W/AUTO DIFF WBC: CPT

## 2024-03-11 PROCEDURE — 86140 C-REACTIVE PROTEIN: CPT

## 2024-03-13 LAB — ALDOLASE SERPL-CCNC: 7.4 U/L (ref 1.2–7.6)

## 2024-04-23 ENCOUNTER — OFFICE VISIT (OUTPATIENT)
Dept: SPORTS MEDICINE | Facility: OTHER | Age: 64
End: 2024-04-23
Payer: COMMERCIAL

## 2024-04-23 VITALS
BODY MASS INDEX: 24.73 KG/M2 | SYSTOLIC BLOOD PRESSURE: 126 MMHG | RESPIRATION RATE: 16 BRPM | HEIGHT: 69 IN | WEIGHT: 167 LBS | HEART RATE: 84 BPM | OXYGEN SATURATION: 96 % | DIASTOLIC BLOOD PRESSURE: 78 MMHG | TEMPERATURE: 98.3 F

## 2024-04-23 DIAGNOSIS — M53.3 PAIN OF RIGHT SACROILIAC JOINT: ICD-10-CM

## 2024-04-23 DIAGNOSIS — M21.70 LEG LENGTH DISCREPANCY: ICD-10-CM

## 2024-04-23 PROCEDURE — 3078F DIAST BP <80 MM HG: CPT | Performed by: FAMILY MEDICINE

## 2024-04-23 PROCEDURE — 3074F SYST BP LT 130 MM HG: CPT | Performed by: FAMILY MEDICINE

## 2024-04-23 PROCEDURE — 99213 OFFICE O/P EST LOW 20 MIN: CPT | Performed by: FAMILY MEDICINE

## 2024-04-23 ASSESSMENT — ENCOUNTER SYMPTOMS
VOMITING: 0
NAUSEA: 0
FEVER: 0
DIZZINESS: 0
CHILLS: 0
SHORTNESS OF BREATH: 0

## 2024-04-23 ASSESSMENT — FIBROSIS 4 INDEX: FIB4 SCORE: 1.51

## 2024-04-23 NOTE — PROGRESS NOTES
Chief Complaint   Patient presents with    Back Pain     Back injury      Subjective     Referred by Self-referred for evaluation of Lumbar pain  Date of injury, Friday, April 19, 2024  Mechanism, pulling a 16 foot tree out of container for planting  [place] sudden burning at the RIGHT anterior thigh  As well as pain at the RIGHT lumbar spine region  He was seeing some improvement up until yesterday (April 22, 2024)  Worse with rotating/twisting and bending  Also worse with getting up from seated position  He went skiing April 22, 2024 and noticed that his pain WORSENED  Tylenol, e-stim, massage chair and icing helped initially  From no fevers, chills or weakness of the lower extremity  He is still experiencing burning of the RIGHT anterior thigh  No other treatments and no recent x-rays    Retired  Skiing, kayaking, hiking, weightlifting and cardiovascular activities    Review of Systems   Constitutional:  Negative for chills and fever.   Respiratory:  Negative for shortness of breath.    Cardiovascular:  Negative for chest pain.   Gastrointestinal:  Negative for nausea and vomiting.   Neurological:  Negative for dizziness.     PMH:  has a past medical history of Back spasm, Dermatomyositis (HCC), GERD (gastroesophageal reflux disease), Hiatal hernia, and Rupture of right plantaris tendon.  MEDS:   Current Outpatient Medications:     levothyroxine (SYNTHROID) 75 MCG Tab, Take 75 mcg by mouth every morning on an empty stomach., Disp: , Rfl:     clindamycin (CLEOCIN T) 1 % Gel, APPLY 1 APPLICATION ON THE SKIN TWICE A DAY, Disp: , Rfl:     rabeprazole (ACIPHEX) 20 MG tablet, Take 1 Tablet by mouth every day., Disp: , Rfl:   ALLERGIES:   Allergies   Allergen Reactions    Flu Virus Vaccine Rash     Redness and Swelling, went away after a couple of days    Gabapentin Unspecified     In combination with methadone    Latex Swelling     SENSITIVITY, NOT ALLERGY. SWELLING AROUND MOUTH AFTER DENTAL SURGERY BUT 3 OTHER SURGERIES  "WITH NO PROBLEM.  SENSITIVITY, NOT ALLERGY. (R/T POWDER THAT USED TO BE IN GLOVES) SWELLING AROUND MOUTH AFTER DENTAL SURGERY BUT 3 OTHER SURGERIES WITH NO PROBLEM.      Methadone     Amoxicillin Unspecified     Other reaction(s): Headaches  \"UNCOMFORTABLE\" FEELING, NOT AN ALLERGY  \"UNCOMFORTABLE\" FEELING, NOT AN ALLERGY       SURGHX:   Past Surgical History:   Procedure Laterality Date    PHLEBECTOMY Bilateral 2009    OTHER  2006    L5-S1 surgery for herniated disc    INGUINAL HERNIA REPAIR Right 2004    SHOULDER ARTHROSCOPY Right 1994     SOCHX:  reports that he has never smoked. He has never used smokeless tobacco. He reports that he does not currently use alcohol after a past usage of about 1.2 oz of alcohol per week. He reports that he does not use drugs.  FH: Family history was reviewed, no pertinent findings to report    Objective   /78 (BP Location: Left arm, Patient Position: Sitting, BP Cuff Size: Adult)   Pulse 84   Temp 36.8 °C (98.3 °F) (Temporal)   Resp 16   Ht 1.753 m (5' 9\")   Wt 75.8 kg (167 lb)   SpO2 96%   BMI 24.66 kg/m²     Lumbar spine exam:  No acute distress  Able to walk on heels and toes  Able to flex to 80° with some discomfort  Extension and lateral rotation with minimal discomfort  Strength testing with hip flexion, knee flexion and extension, ankle dorsiflexion and plantarflexion, and EHL testing were 5 out of 5 bilaterally  Sensation was intact bilaterally  The legs were otherwise neurovascularly intact    HIP EXAM:  NORMAL gait    Right hip: Range of motion is intact  MILD pain with internal rotation  linda's test is NEGATIVE but he does have some tightness  NO tenderness of the trochanteric bursa  NO tenderness of the gluteus medius  Sherrell's test is NEGATIVE    Left hip: Range of motion is intact  NEGATIVE pain with internal rotation  linda's test is NEGATIVE with POSITIVE tightness  NO tenderness of the trochanteric bursa  NO tenderness of the gluteus medius  Sherrell's test " is NEGATIVE    Correctable leg length discrepancy  RIGHT lower extremity is approximately 1 cm longer than the left which is corrected after butt bridge    1. Pain of right sacroiliac joint        2. Leg length discrepancy (CORRECTABLE, R is 1 CM shorter than L)          Date of injury, Friday, April 19, 2024  Mechanism, pulling a 16 foot tree out of container for planting  [place] sudden burning at the RIGHT anterior thigh    Provided with SI joint exercises    No follow-ups on file.  To see how he is doing with SI joint exercises at that point    We may consider lumbar spine x-rays if symptoms persist or fail to improve    Self-referred

## 2024-04-29 DIAGNOSIS — R73.09 HIGH GLUCOSE LEVEL: ICD-10-CM

## 2024-04-29 DIAGNOSIS — Z82.49 FAMILY HISTORY OF CARDIOVASCULAR DISEASE: ICD-10-CM

## 2024-05-09 DIAGNOSIS — H92.09 OTALGIA, UNSPECIFIED LATERALITY: ICD-10-CM

## 2024-05-09 DIAGNOSIS — M53.3 PAIN OF RIGHT SACROILIAC JOINT: ICD-10-CM

## 2024-05-09 DIAGNOSIS — M21.70 LEG LENGTH DISCREPANCY: ICD-10-CM

## 2024-05-09 RX ORDER — MELOXICAM 7.5 MG/1
7.5 TABLET ORAL
Qty: 30 TABLET | Refills: 1 | Status: SHIPPED | OUTPATIENT
Start: 2024-05-09

## 2024-05-14 ENCOUNTER — OFFICE VISIT (OUTPATIENT)
Dept: SPORTS MEDICINE | Facility: OTHER | Age: 64
End: 2024-05-14
Payer: COMMERCIAL

## 2024-05-14 VITALS
HEIGHT: 69 IN | DIASTOLIC BLOOD PRESSURE: 82 MMHG | WEIGHT: 167 LBS | BODY MASS INDEX: 24.73 KG/M2 | HEART RATE: 77 BPM | RESPIRATION RATE: 16 BRPM | OXYGEN SATURATION: 97 % | SYSTOLIC BLOOD PRESSURE: 126 MMHG | TEMPERATURE: 98.2 F

## 2024-05-14 DIAGNOSIS — M53.3 PAIN OF RIGHT SACROILIAC JOINT: ICD-10-CM

## 2024-05-14 DIAGNOSIS — M21.70 LEG LENGTH DISCREPANCY: ICD-10-CM

## 2024-05-14 PROCEDURE — 3074F SYST BP LT 130 MM HG: CPT | Performed by: FAMILY MEDICINE

## 2024-05-14 PROCEDURE — 99213 OFFICE O/P EST LOW 20 MIN: CPT | Performed by: FAMILY MEDICINE

## 2024-05-14 PROCEDURE — 3079F DIAST BP 80-89 MM HG: CPT | Performed by: FAMILY MEDICINE

## 2024-05-14 ASSESSMENT — FIBROSIS 4 INDEX: FIB4 SCORE: 1.51

## 2024-05-14 NOTE — PROGRESS NOTES
"Chief Complaint   Patient presents with    Back Pain     R low back pain      Subjective     Referred by Self-referred for evaluation of Lumbar pain  Date of injury, Friday, April 19, 2024  Mechanism, pulling a 16 foot tree out of container for planting  [place] sudden burning at the RIGHT anterior thigh  As well as pain at the RIGHT lumbar spine region  He was seeing some improvement up until yesterday (April 22, 2024)  Worse with rotating/twisting and bending  Also worse with getting up from seated position  He went skiing April 22, 2024 and noticed that his pain WORSENED  Tylenol, e-stim, massage chair and icing helped initially  From no fevers, chills or weakness of the lower extremity  He is still experiencing burning of the RIGHT anterior thigh  No other treatments and no recent x-rays    Update:  Symptoms are BETTER  He has had some achiness when he starts his physical activity and post activity achiness, but overall he is approximately 85% improved    Retired  [employer]  Skiing, kayaking, hiking, weightlifting and cardiovascular activities    Objective   /82 (BP Location: Left arm, Patient Position: Sitting, BP Cuff Size: Adult)   Pulse 77   Temp 36.8 °C (98.2 °F) (Temporal)   Resp 16   Ht 1.753 m (5' 9\")   Wt 75.8 kg (167 lb)   SpO2 97%   BMI 24.66 kg/m²     Lumbar spine exam:  No acute distress  Able to walk on heels and toes  Able to flex to 80° with minimal discomfort  Extension and lateral rotation with minimal discomfort  Strength testing with hip flexion, knee flexion and extension, ankle dorsiflexion and plantarflexion, and EHL testing were 5 out of 5 bilaterally  Sensation was intact bilaterally  The legs were otherwise neurovascularly intact    HIP EXAM:  NORMAL gait    Right hip: Range of motion is intact  NO Pain with internal rotation  linda's test is NEGATIVE but he does have some tightness  NO tenderness of the trochanteric bursa  NO tenderness of the gluteus medius  Sherrell's test " is NEGATIVE    Left hip: Range of motion is intact  NEGATIVE pain with internal rotation  linda's test is NEGATIVE with POSITIVE tightness  NO tenderness of the trochanteric bursa  NO tenderness of the gluteus medius  Sherrell's test is NEGATIVE    Correctable leg length discrepancy  NO Leg Length Discrepancy on TODAY's exam     1. Pain of right sacroiliac joint        2. Leg length discrepancy (CORRECTABLE, R is 1 CM shorter than L)          Date of injury, Friday, April 19, 2024  Mechanism, pulling a 16 foot tree out of container for planting  [place] sudden burning at the RIGHT anterior thigh    SI joint exercises have HELPED  About 85% improved    Making strides with HEP and SI joint    Expect he will see continued improvement over the coming weeks    Follow up as needed     We may consider lumbar spine x-rays or formal PT if symptoms persist or fail to improve    Self-referred

## 2024-06-17 ENCOUNTER — TELEPHONE (OUTPATIENT)
Dept: SPORTS MEDICINE | Facility: OTHER | Age: 64
End: 2024-06-17
Payer: COMMERCIAL

## 2024-06-17 NOTE — TELEPHONE ENCOUNTER
Patient is requesting referral to vascular surgeon out of state at [place], Dr. Michele medina at vascular clinic, phone number [phone]. Due to his diagnosis given on 06/14/2024 when he was seen at [facility], please advise thank you.

## 2024-06-24 ENCOUNTER — OFFICE VISIT (OUTPATIENT)
Dept: MEDICAL GROUP | Facility: CLINIC | Age: 64
End: 2024-06-24
Payer: COMMERCIAL

## 2024-06-24 VITALS
RESPIRATION RATE: 17 BRPM | HEIGHT: 69 IN | HEART RATE: 73 BPM | DIASTOLIC BLOOD PRESSURE: 90 MMHG | OXYGEN SATURATION: 97 % | BODY MASS INDEX: 24.29 KG/M2 | WEIGHT: 164 LBS | SYSTOLIC BLOOD PRESSURE: 150 MMHG

## 2024-06-24 DIAGNOSIS — G54.0 TOS (THORACIC OUTLET SYNDROME): ICD-10-CM

## 2024-06-24 DIAGNOSIS — K21.9 GASTROESOPHAGEAL REFLUX DISEASE WITHOUT ESOPHAGITIS: ICD-10-CM

## 2024-06-24 DIAGNOSIS — Z82.49 FAMILY HISTORY OF CARDIOVASCULAR DISEASE: ICD-10-CM

## 2024-06-24 PROCEDURE — 3080F DIAST BP >= 90 MM HG: CPT | Performed by: FAMILY MEDICINE

## 2024-06-24 PROCEDURE — 99213 OFFICE O/P EST LOW 20 MIN: CPT | Performed by: FAMILY MEDICINE

## 2024-06-24 PROCEDURE — 3077F SYST BP >= 140 MM HG: CPT | Performed by: FAMILY MEDICINE

## 2024-06-24 ASSESSMENT — PATIENT HEALTH QUESTIONNAIRE - PHQ9: CLINICAL INTERPRETATION OF PHQ2 SCORE: 0

## 2024-06-24 ASSESSMENT — FIBROSIS 4 INDEX: FIB4 SCORE: 1.65

## 2024-07-01 ENCOUNTER — APPOINTMENT (OUTPATIENT)
Dept: MEDICAL GROUP | Facility: CLINIC | Age: 64
End: 2024-07-01
Payer: COMMERCIAL

## 2024-07-03 ENCOUNTER — APPOINTMENT (OUTPATIENT)
Dept: RADIOLOGY | Facility: MEDICAL CENTER | Age: 64
End: 2024-07-03
Attending: EMERGENCY MEDICINE
Payer: COMMERCIAL

## 2024-07-03 ENCOUNTER — HOSPITAL ENCOUNTER (OUTPATIENT)
Facility: MEDICAL CENTER | Age: 64
End: 2024-07-04
Attending: EMERGENCY MEDICINE | Admitting: INTERNAL MEDICINE
Payer: COMMERCIAL

## 2024-07-03 ENCOUNTER — APPOINTMENT (OUTPATIENT)
Dept: RADIOLOGY | Facility: MEDICAL CENTER | Age: 64
End: 2024-07-03
Attending: NURSE PRACTITIONER
Payer: COMMERCIAL

## 2024-07-03 DIAGNOSIS — H53.8 BLURRY VISION, LEFT EYE: ICD-10-CM

## 2024-07-03 DIAGNOSIS — H34.9 RETINAL ARTERY OCCLUSION: ICD-10-CM

## 2024-07-03 PROBLEM — Z85.810 HISTORY OF TONGUE CANCER: Status: ACTIVE | Noted: 2024-07-03

## 2024-07-03 PROBLEM — G54.0 TOS (THORACIC OUTLET SYNDROME): Status: ACTIVE | Noted: 2024-07-03

## 2024-07-03 PROBLEM — I61.9 CVA (CEREBROVASCULAR ACCIDENT DUE TO INTRACEREBRAL HEMORRHAGE) (HCC): Status: ACTIVE | Noted: 2024-07-03

## 2024-07-03 PROBLEM — I15.8 OTHER SECONDARY HYPERTENSION: Status: ACTIVE | Noted: 2024-07-03

## 2024-07-03 LAB
ABO + RH BLD: NORMAL
ABO GROUP BLD: NORMAL
ALBUMIN SERPL BCP-MCNC: 4.5 G/DL (ref 3.2–4.9)
ALBUMIN/GLOB SERPL: 2 G/DL
ALP SERPL-CCNC: 89 U/L (ref 30–99)
ALT SERPL-CCNC: 13 U/L (ref 2–50)
ANION GAP SERPL CALC-SCNC: 13 MMOL/L (ref 7–16)
APTT PPP: 31.8 SEC (ref 24.7–36)
AST SERPL-CCNC: 24 U/L (ref 12–45)
BASOPHILS # BLD AUTO: 0.3 % (ref 0–1.8)
BASOPHILS # BLD: 0.02 K/UL (ref 0–0.12)
BILIRUB SERPL-MCNC: 0.3 MG/DL (ref 0.1–1.5)
BLD GP AB SCN SERPL QL: NORMAL
BUN SERPL-MCNC: 17 MG/DL (ref 8–22)
CALCIUM ALBUM COR SERPL-MCNC: 9.2 MG/DL (ref 8.5–10.5)
CALCIUM SERPL-MCNC: 9.6 MG/DL (ref 8.5–10.5)
CHLORIDE SERPL-SCNC: 105 MMOL/L (ref 96–112)
CO2 SERPL-SCNC: 24 MMOL/L (ref 20–33)
CREAT SERPL-MCNC: 1.03 MG/DL (ref 0.5–1.4)
CRP SERPL HS-MCNC: <0.3 MG/DL (ref 0–0.75)
EKG IMPRESSION: NORMAL
EOSINOPHIL # BLD AUTO: 0.13 K/UL (ref 0–0.51)
EOSINOPHIL NFR BLD: 2 % (ref 0–6.9)
ERYTHROCYTE [DISTWIDTH] IN BLOOD BY AUTOMATED COUNT: 42.4 FL (ref 35.9–50)
ERYTHROCYTE [SEDIMENTATION RATE] IN BLOOD BY WESTERGREN METHOD: 9 MM/HOUR (ref 0–20)
GFR SERPLBLD CREATININE-BSD FMLA CKD-EPI: 82 ML/MIN/1.73 M 2
GLOBULIN SER CALC-MCNC: 2.3 G/DL (ref 1.9–3.5)
GLUCOSE BLD STRIP.AUTO-MCNC: 93 MG/DL (ref 65–99)
GLUCOSE SERPL-MCNC: 102 MG/DL (ref 65–99)
HCT VFR BLD AUTO: 39.3 % (ref 42–52)
HGB BLD-MCNC: 12.9 G/DL (ref 14–18)
IMM GRANULOCYTES # BLD AUTO: 0.02 K/UL (ref 0–0.11)
IMM GRANULOCYTES NFR BLD AUTO: 0.3 % (ref 0–0.9)
INR PPP: 1.04 (ref 0.87–1.13)
LYMPHOCYTES # BLD AUTO: 1.37 K/UL (ref 1–4.8)
LYMPHOCYTES NFR BLD: 20.9 % (ref 22–41)
MAGNESIUM SERPL-MCNC: 1.9 MG/DL (ref 1.5–2.5)
MCH RBC QN AUTO: 28 PG (ref 27–33)
MCHC RBC AUTO-ENTMCNC: 32.8 G/DL (ref 32.3–36.5)
MCV RBC AUTO: 85.2 FL (ref 81.4–97.8)
MONOCYTES # BLD AUTO: 0.56 K/UL (ref 0–0.85)
MONOCYTES NFR BLD AUTO: 8.5 % (ref 0–13.4)
NEUTROPHILS # BLD AUTO: 4.45 K/UL (ref 1.82–7.42)
NEUTROPHILS NFR BLD: 68 % (ref 44–72)
NRBC # BLD AUTO: 0 K/UL
NRBC BLD-RTO: 0 /100 WBC (ref 0–0.2)
PLATELET # BLD AUTO: 231 K/UL (ref 164–446)
PMV BLD AUTO: 9.3 FL (ref 9–12.9)
POTASSIUM SERPL-SCNC: 4.2 MMOL/L (ref 3.6–5.5)
PROT SERPL-MCNC: 6.8 G/DL (ref 6–8.2)
PROTHROMBIN TIME: 13.7 SEC (ref 12–14.6)
RBC # BLD AUTO: 4.61 M/UL (ref 4.7–6.1)
RH BLD: NORMAL
SODIUM SERPL-SCNC: 142 MMOL/L (ref 135–145)
TROPONIN T SERPL-MCNC: 9 NG/L (ref 6–19)
WBC # BLD AUTO: 6.6 K/UL (ref 4.8–10.8)

## 2024-07-03 PROCEDURE — 700105 HCHG RX REV CODE 258: Performed by: NURSE PRACTITIONER

## 2024-07-03 PROCEDURE — 84484 ASSAY OF TROPONIN QUANT: CPT

## 2024-07-03 PROCEDURE — 85652 RBC SED RATE AUTOMATED: CPT

## 2024-07-03 PROCEDURE — 99285 EMERGENCY DEPT VISIT HI MDM: CPT

## 2024-07-03 PROCEDURE — A9270 NON-COVERED ITEM OR SERVICE: HCPCS | Performed by: NURSE PRACTITIONER

## 2024-07-03 PROCEDURE — 36415 COLL VENOUS BLD VENIPUNCTURE: CPT

## 2024-07-03 PROCEDURE — 71045 X-RAY EXAM CHEST 1 VIEW: CPT

## 2024-07-03 PROCEDURE — 85610 PROTHROMBIN TIME: CPT

## 2024-07-03 PROCEDURE — 96374 THER/PROPH/DIAG INJ IV PUSH: CPT

## 2024-07-03 PROCEDURE — 82962 GLUCOSE BLOOD TEST: CPT

## 2024-07-03 PROCEDURE — 700102 HCHG RX REV CODE 250 W/ 637 OVERRIDE(OP): Performed by: NURSE PRACTITIONER

## 2024-07-03 PROCEDURE — 93005 ELECTROCARDIOGRAM TRACING: CPT | Performed by: EMERGENCY MEDICINE

## 2024-07-03 PROCEDURE — G0378 HOSPITAL OBSERVATION PER HR: HCPCS

## 2024-07-03 PROCEDURE — 86140 C-REACTIVE PROTEIN: CPT

## 2024-07-03 PROCEDURE — 86901 BLOOD TYPING SEROLOGIC RH(D): CPT

## 2024-07-03 PROCEDURE — 0042T CT-CEREBRAL PERFUSION ANALYSIS: CPT

## 2024-07-03 PROCEDURE — 86850 RBC ANTIBODY SCREEN: CPT

## 2024-07-03 PROCEDURE — 85730 THROMBOPLASTIN TIME PARTIAL: CPT

## 2024-07-03 PROCEDURE — 70496 CT ANGIOGRAPHY HEAD: CPT

## 2024-07-03 PROCEDURE — 80053 COMPREHEN METABOLIC PANEL: CPT

## 2024-07-03 PROCEDURE — 70551 MRI BRAIN STEM W/O DYE: CPT

## 2024-07-03 PROCEDURE — 99223 1ST HOSP IP/OBS HIGH 75: CPT | Mod: FS | Performed by: INTERNAL MEDICINE

## 2024-07-03 PROCEDURE — 83735 ASSAY OF MAGNESIUM: CPT

## 2024-07-03 PROCEDURE — 70498 CT ANGIOGRAPHY NECK: CPT

## 2024-07-03 PROCEDURE — 700117 HCHG RX CONTRAST REV CODE 255: Performed by: EMERGENCY MEDICINE

## 2024-07-03 PROCEDURE — 700111 HCHG RX REV CODE 636 W/ 250 OVERRIDE (IP): Mod: JZ | Performed by: NURSE PRACTITIONER

## 2024-07-03 PROCEDURE — 86900 BLOOD TYPING SEROLOGIC ABO: CPT

## 2024-07-03 PROCEDURE — 85025 COMPLETE CBC W/AUTO DIFF WBC: CPT

## 2024-07-03 RX ORDER — ASPIRIN 81 MG/1
81 TABLET, CHEWABLE ORAL DAILY
Status: DISCONTINUED | OUTPATIENT
Start: 2024-07-04 | End: 2024-07-04 | Stop reason: HOSPADM

## 2024-07-03 RX ORDER — ASPIRIN 300 MG/1
300 SUPPOSITORY RECTAL DAILY
Status: DISCONTINUED | OUTPATIENT
Start: 2024-07-04 | End: 2024-07-04 | Stop reason: HOSPADM

## 2024-07-03 RX ORDER — SODIUM CHLORIDE, SODIUM LACTATE, POTASSIUM CHLORIDE, CALCIUM CHLORIDE 600; 310; 30; 20 MG/100ML; MG/100ML; MG/100ML; MG/100ML
INJECTION, SOLUTION INTRAVENOUS CONTINUOUS
Status: DISCONTINUED | OUTPATIENT
Start: 2024-07-03 | End: 2024-07-04 | Stop reason: HOSPADM

## 2024-07-03 RX ORDER — HEPARIN SODIUM 5000 [USP'U]/ML
5000 INJECTION, SOLUTION INTRAVENOUS; SUBCUTANEOUS EVERY 8 HOURS
Status: DISCONTINUED | OUTPATIENT
Start: 2024-07-03 | End: 2024-07-03

## 2024-07-03 RX ORDER — ACETAMINOPHEN 325 MG/1
650 TABLET ORAL EVERY 6 HOURS PRN
Status: DISCONTINUED | OUTPATIENT
Start: 2024-07-03 | End: 2024-07-04 | Stop reason: HOSPADM

## 2024-07-03 RX ORDER — CREATINE 100 %
1 POWDER (GRAM) MISCELLANEOUS DAILY
COMMUNITY

## 2024-07-03 RX ORDER — HYDRALAZINE HYDROCHLORIDE 20 MG/ML
10 INJECTION INTRAMUSCULAR; INTRAVENOUS EVERY 6 HOURS PRN
Status: DISCONTINUED | OUTPATIENT
Start: 2024-07-03 | End: 2024-07-04 | Stop reason: HOSPADM

## 2024-07-03 RX ORDER — HEPARIN SODIUM 5000 [USP'U]/ML
5000 INJECTION, SOLUTION INTRAVENOUS; SUBCUTANEOUS EVERY 8 HOURS
Status: DISCONTINUED | OUTPATIENT
Start: 2024-07-04 | End: 2024-07-03

## 2024-07-03 RX ADMIN — SODIUM CHLORIDE, POTASSIUM CHLORIDE, SODIUM LACTATE AND CALCIUM CHLORIDE: 600; 310; 30; 20 INJECTION, SOLUTION INTRAVENOUS at 21:18

## 2024-07-03 RX ADMIN — IOHEXOL 40 ML: 350 INJECTION, SOLUTION INTRAVENOUS at 17:30

## 2024-07-03 RX ADMIN — HYDRALAZINE HYDROCHLORIDE 10 MG: 20 INJECTION, SOLUTION INTRAMUSCULAR; INTRAVENOUS at 21:22

## 2024-07-03 RX ADMIN — RIVAROXABAN 10 MG: 10 TABLET, FILM COATED ORAL at 23:21

## 2024-07-03 RX ADMIN — IOHEXOL 75 ML: 350 INJECTION, SOLUTION INTRAVENOUS at 17:28

## 2024-07-03 SDOH — ECONOMIC STABILITY: TRANSPORTATION INSECURITY
IN THE PAST 12 MONTHS, HAS THE LACK OF TRANSPORTATION KEPT YOU FROM MEDICAL APPOINTMENTS OR FROM GETTING MEDICATIONS?: NO

## 2024-07-03 SDOH — ECONOMIC STABILITY: TRANSPORTATION INSECURITY
IN THE PAST 12 MONTHS, HAS LACK OF RELIABLE TRANSPORTATION KEPT YOU FROM MEDICAL APPOINTMENTS, MEETINGS, WORK OR FROM GETTING THINGS NEEDED FOR DAILY LIVING?: NO

## 2024-07-03 ASSESSMENT — ENCOUNTER SYMPTOMS
NAUSEA: 0
RESPIRATORY NEGATIVE: 1
DEPRESSION: 0
DIZZINESS: 0
FEVER: 0
CONSTITUTIONAL NEGATIVE: 1
PALPITATIONS: 0
MYALGIAS: 0
WEAKNESS: 0
DIAPHORESIS: 0
BLURRED VISION: 1
SINUS PAIN: 0
GASTROINTESTINAL NEGATIVE: 1
TINGLING: 1
EYE REDNESS: 0
SENSORY CHANGE: 0
NERVOUS/ANXIOUS: 0
EYES NEGATIVE: 1
PSYCHIATRIC NEGATIVE: 1
EYE PAIN: 0
CONSTIPATION: 0
HEADACHES: 0
DOUBLE VISION: 0
SPEECH CHANGE: 0
CARDIOVASCULAR NEGATIVE: 1
COUGH: 0
PHOTOPHOBIA: 0
SORE THROAT: 0
ABDOMINAL PAIN: 0
NEUROLOGICAL NEGATIVE: 1
DIARRHEA: 0
FOCAL WEAKNESS: 0
CHILLS: 0

## 2024-07-03 ASSESSMENT — SOCIAL DETERMINANTS OF HEALTH (SDOH)
IN THE PAST 12 MONTHS, HAS THE ELECTRIC, GAS, OIL, OR WATER COMPANY THREATENED TO SHUT OFF SERVICE IN YOUR HOME?: NO
WITHIN THE LAST YEAR, HAVE YOU BEEN KICKED, HIT, SLAPPED, OR OTHERWISE PHYSICALLY HURT BY YOUR PARTNER OR EX-PARTNER?: NO
WITHIN THE PAST 12 MONTHS, THE FOOD YOU BOUGHT JUST DIDN'T LAST AND YOU DIDN'T HAVE MONEY TO GET MORE: NEVER TRUE
WITHIN THE LAST YEAR, HAVE YOU BEEN AFRAID OF YOUR PARTNER OR EX-PARTNER?: NO
WITHIN THE LAST YEAR, HAVE YOU BEEN HUMILIATED OR EMOTIONALLY ABUSED IN OTHER WAYS BY YOUR PARTNER OR EX-PARTNER?: NO
WITHIN THE LAST YEAR, HAVE TO BEEN RAPED OR FORCED TO HAVE ANY KIND OF SEXUAL ACTIVITY BY YOUR PARTNER OR EX-PARTNER?: NO
WITHIN THE PAST 12 MONTHS, YOU WORRIED THAT YOUR FOOD WOULD RUN OUT BEFORE YOU GOT THE MONEY TO BUY MORE: NEVER TRUE

## 2024-07-03 ASSESSMENT — LIFESTYLE VARIABLES
TOTAL SCORE: 0
DOES PATIENT WANT TO STOP DRINKING: NO
HAVE YOU EVER FELT YOU SHOULD CUT DOWN ON YOUR DRINKING: NO
EVER FELT BAD OR GUILTY ABOUT YOUR DRINKING: NO
ALCOHOL_USE: NO
ON A TYPICAL DAY WHEN YOU DRINK ALCOHOL HOW MANY DRINKS DO YOU HAVE: 0
EVER HAD A DRINK FIRST THING IN THE MORNING TO STEADY YOUR NERVES TO GET RID OF A HANGOVER: NO
HOW MANY TIMES IN THE PAST YEAR HAVE YOU HAD 5 OR MORE DRINKS IN A DAY: 0
AVERAGE NUMBER OF DAYS PER WEEK YOU HAVE A DRINK CONTAINING ALCOHOL: 0
HAVE PEOPLE ANNOYED YOU BY CRITICIZING YOUR DRINKING: NO
CONSUMPTION TOTAL: NEGATIVE
TOTAL SCORE: 0
TOTAL SCORE: 0

## 2024-07-03 ASSESSMENT — PATIENT HEALTH QUESTIONNAIRE - PHQ9
2. FEELING DOWN, DEPRESSED, IRRITABLE, OR HOPELESS: NOT AT ALL
1. LITTLE INTEREST OR PLEASURE IN DOING THINGS: NOT AT ALL
SUM OF ALL RESPONSES TO PHQ9 QUESTIONS 1 AND 2: 0

## 2024-07-03 ASSESSMENT — PAIN DESCRIPTION - PAIN TYPE
TYPE: ACUTE PAIN
TYPE: ACUTE PAIN

## 2024-07-03 ASSESSMENT — FIBROSIS 4 INDEX
FIB4 SCORE: 1.79
FIB4 SCORE: 1.65

## 2024-07-04 ENCOUNTER — APPOINTMENT (OUTPATIENT)
Dept: CARDIOLOGY | Facility: MEDICAL CENTER | Age: 64
End: 2024-07-04
Attending: NURSE PRACTITIONER
Payer: COMMERCIAL

## 2024-07-04 VITALS
BODY MASS INDEX: 22.86 KG/M2 | TEMPERATURE: 98.9 F | HEIGHT: 69 IN | WEIGHT: 154.32 LBS | DIASTOLIC BLOOD PRESSURE: 83 MMHG | RESPIRATION RATE: 14 BRPM | HEART RATE: 70 BPM | SYSTOLIC BLOOD PRESSURE: 139 MMHG | OXYGEN SATURATION: 95 %

## 2024-07-04 PROBLEM — H53.8 BLURRY VISION, LEFT EYE: Status: ACTIVE | Noted: 2024-07-03

## 2024-07-04 PROBLEM — H34.9 RETINAL ARTERY OCCLUSION: Status: RESOLVED | Noted: 2024-07-03 | Resolved: 2024-07-04

## 2024-07-04 PROBLEM — E78.5 HYPERLIPIDEMIA: Status: ACTIVE | Noted: 2024-07-04

## 2024-07-04 LAB
ANION GAP SERPL CALC-SCNC: 13 MMOL/L (ref 7–16)
BUN SERPL-MCNC: 15 MG/DL (ref 8–22)
CALCIUM SERPL-MCNC: 9.5 MG/DL (ref 8.5–10.5)
CHLORIDE SERPL-SCNC: 106 MMOL/L (ref 96–112)
CHOLEST SERPL-MCNC: 220 MG/DL (ref 100–199)
CO2 SERPL-SCNC: 23 MMOL/L (ref 20–33)
CREAT SERPL-MCNC: 0.83 MG/DL (ref 0.5–1.4)
ERYTHROCYTE [DISTWIDTH] IN BLOOD BY AUTOMATED COUNT: 41 FL (ref 35.9–50)
EST. AVERAGE GLUCOSE BLD GHB EST-MCNC: 120 MG/DL
GFR SERPLBLD CREATININE-BSD FMLA CKD-EPI: 98 ML/MIN/1.73 M 2
GLUCOSE SERPL-MCNC: 95 MG/DL (ref 65–99)
HBA1C MFR BLD: 5.8 % (ref 4–5.6)
HCT VFR BLD AUTO: 38.7 % (ref 42–52)
HDLC SERPL-MCNC: 47 MG/DL
HGB BLD-MCNC: 12.8 G/DL (ref 14–18)
LDLC SERPL CALC-MCNC: 130 MG/DL
LV EJECT FRACT  99904: 70
LV EJECT FRACT MOD 2C 99903: 66.48
LV EJECT FRACT MOD 4C 99902: 62.95
LV EJECT FRACT MOD BP 99901: 64.44
MCH RBC QN AUTO: 27.4 PG (ref 27–33)
MCHC RBC AUTO-ENTMCNC: 33.1 G/DL (ref 32.3–36.5)
MCV RBC AUTO: 82.7 FL (ref 81.4–97.8)
PLATELET # BLD AUTO: 206 K/UL (ref 164–446)
PMV BLD AUTO: 9.4 FL (ref 9–12.9)
POTASSIUM SERPL-SCNC: 4 MMOL/L (ref 3.6–5.5)
RBC # BLD AUTO: 4.68 M/UL (ref 4.7–6.1)
SODIUM SERPL-SCNC: 142 MMOL/L (ref 135–145)
TRIGL SERPL-MCNC: 214 MG/DL (ref 0–149)
TSH SERPL DL<=0.005 MIU/L-ACNC: 6.05 UIU/ML (ref 0.38–5.33)
VIT B12 SERPL-MCNC: 273 PG/ML (ref 211–911)
WBC # BLD AUTO: 7.6 K/UL (ref 4.8–10.8)

## 2024-07-04 PROCEDURE — 82607 VITAMIN B-12: CPT

## 2024-07-04 PROCEDURE — 700102 HCHG RX REV CODE 250 W/ 637 OVERRIDE(OP): Performed by: NURSE PRACTITIONER

## 2024-07-04 PROCEDURE — 83036 HEMOGLOBIN GLYCOSYLATED A1C: CPT

## 2024-07-04 PROCEDURE — G0378 HOSPITAL OBSERVATION PER HR: HCPCS

## 2024-07-04 PROCEDURE — 84443 ASSAY THYROID STIM HORMONE: CPT

## 2024-07-04 PROCEDURE — 80061 LIPID PANEL: CPT

## 2024-07-04 PROCEDURE — 93306 TTE W/DOPPLER COMPLETE: CPT | Mod: 26 | Performed by: INTERNAL MEDICINE

## 2024-07-04 PROCEDURE — 80048 BASIC METABOLIC PNL TOTAL CA: CPT

## 2024-07-04 PROCEDURE — 85027 COMPLETE CBC AUTOMATED: CPT

## 2024-07-04 PROCEDURE — A9270 NON-COVERED ITEM OR SERVICE: HCPCS | Performed by: NURSE PRACTITIONER

## 2024-07-04 PROCEDURE — 36415 COLL VENOUS BLD VENIPUNCTURE: CPT

## 2024-07-04 PROCEDURE — 99239 HOSP IP/OBS DSCHRG MGMT >30: CPT | Mod: FS | Performed by: INTERNAL MEDICINE

## 2024-07-04 PROCEDURE — 93306 TTE W/DOPPLER COMPLETE: CPT

## 2024-07-04 RX ORDER — ATORVASTATIN CALCIUM 40 MG/1
40 TABLET, FILM COATED ORAL EVERY EVENING
Status: DISCONTINUED | OUTPATIENT
Start: 2024-07-04 | End: 2024-07-04 | Stop reason: HOSPADM

## 2024-07-04 RX ADMIN — ASPIRIN 81 MG: 81 TABLET, CHEWABLE ORAL at 06:16

## 2024-07-04 ASSESSMENT — PAIN DESCRIPTION - PAIN TYPE: TYPE: ACUTE PAIN

## 2024-08-08 ENCOUNTER — HOSPITAL ENCOUNTER (OUTPATIENT)
Dept: LAB | Facility: MEDICAL CENTER | Age: 64
End: 2024-08-08
Attending: STUDENT IN AN ORGANIZED HEALTH CARE EDUCATION/TRAINING PROGRAM
Payer: OTHER MISCELLANEOUS

## 2024-08-08 ENCOUNTER — HOSPITAL ENCOUNTER (OUTPATIENT)
Dept: LAB | Facility: MEDICAL CENTER | Age: 64
End: 2024-08-08
Attending: FAMILY MEDICINE
Payer: COMMERCIAL

## 2024-08-08 DIAGNOSIS — Z82.49 FAMILY HISTORY OF CARDIOVASCULAR DISEASE: ICD-10-CM

## 2024-08-08 DIAGNOSIS — K21.9 GASTROESOPHAGEAL REFLUX DISEASE WITHOUT ESOPHAGITIS: ICD-10-CM

## 2024-08-08 DIAGNOSIS — G54.0 TOS (THORACIC OUTLET SYNDROME): ICD-10-CM

## 2024-08-08 LAB
BASOPHILS # BLD AUTO: 0.3 % (ref 0–1.8)
BASOPHILS # BLD: 0.02 K/UL (ref 0–0.12)
CHOLEST SERPL-MCNC: 205 MG/DL (ref 100–199)
EOSINOPHIL # BLD AUTO: 0.21 K/UL (ref 0–0.51)
EOSINOPHIL NFR BLD: 3 % (ref 0–6.9)
ERYTHROCYTE [DISTWIDTH] IN BLOOD BY AUTOMATED COUNT: 41.9 FL (ref 35.9–50)
ERYTHROCYTE [SEDIMENTATION RATE] IN BLOOD BY WESTERGREN METHOD: 5 MM/HOUR (ref 0–20)
EST. AVERAGE GLUCOSE BLD GHB EST-MCNC: 117 MG/DL
HBA1C MFR BLD: 5.7 % (ref 4–5.6)
HCT VFR BLD AUTO: 42.7 % (ref 42–52)
HDLC SERPL-MCNC: 56 MG/DL
HGB BLD-MCNC: 13.9 G/DL (ref 14–18)
IMM GRANULOCYTES # BLD AUTO: 0.03 K/UL (ref 0–0.11)
IMM GRANULOCYTES NFR BLD AUTO: 0.4 % (ref 0–0.9)
LDLC SERPL CALC-MCNC: 119 MG/DL
LYMPHOCYTES # BLD AUTO: 1.61 K/UL (ref 1–4.8)
LYMPHOCYTES NFR BLD: 22.8 % (ref 22–41)
MCH RBC QN AUTO: 27.5 PG (ref 27–33)
MCHC RBC AUTO-ENTMCNC: 32.6 G/DL (ref 32.3–36.5)
MCV RBC AUTO: 84.6 FL (ref 81.4–97.8)
MONOCYTES # BLD AUTO: 0.63 K/UL (ref 0–0.85)
MONOCYTES NFR BLD AUTO: 8.9 % (ref 0–13.4)
NEUTROPHILS # BLD AUTO: 4.57 K/UL (ref 1.82–7.42)
NEUTROPHILS NFR BLD: 64.6 % (ref 44–72)
NRBC # BLD AUTO: 0 K/UL
NRBC BLD-RTO: 0 /100 WBC (ref 0–0.2)
PLATELET # BLD AUTO: 258 K/UL (ref 164–446)
PMV BLD AUTO: 9.8 FL (ref 9–12.9)
RBC # BLD AUTO: 5.05 M/UL (ref 4.7–6.1)
TRIGL SERPL-MCNC: 152 MG/DL (ref 0–149)
WBC # BLD AUTO: 7.1 K/UL (ref 4.8–10.8)

## 2024-08-08 PROCEDURE — 82550 ASSAY OF CK (CPK): CPT

## 2024-08-08 PROCEDURE — 84153 ASSAY OF PSA TOTAL: CPT

## 2024-08-08 PROCEDURE — 85025 COMPLETE CBC W/AUTO DIFF WBC: CPT

## 2024-08-08 PROCEDURE — 36415 COLL VENOUS BLD VENIPUNCTURE: CPT

## 2024-08-08 PROCEDURE — 83036 HEMOGLOBIN GLYCOSYLATED A1C: CPT

## 2024-08-08 PROCEDURE — 85652 RBC SED RATE AUTOMATED: CPT

## 2024-08-08 PROCEDURE — 82085 ASSAY OF ALDOLASE: CPT

## 2024-08-08 PROCEDURE — 80061 LIPID PANEL: CPT

## 2024-08-08 PROCEDURE — 83615 LACTATE (LD) (LDH) ENZYME: CPT

## 2024-08-08 PROCEDURE — 86140 C-REACTIVE PROTEIN: CPT

## 2024-08-08 PROCEDURE — 84154 ASSAY OF PSA FREE: CPT

## 2024-08-08 PROCEDURE — 80053 COMPREHEN METABOLIC PANEL: CPT

## 2024-08-09 LAB
ALBUMIN SERPL BCP-MCNC: 4.5 G/DL (ref 3.2–4.9)
ALBUMIN/GLOB SERPL: 1.5 G/DL
ALP SERPL-CCNC: 97 U/L (ref 30–99)
ALT SERPL-CCNC: 23 U/L (ref 2–50)
ANION GAP SERPL CALC-SCNC: 9 MMOL/L (ref 7–16)
AST SERPL-CCNC: 29 U/L (ref 12–45)
BILIRUB SERPL-MCNC: 0.4 MG/DL (ref 0.1–1.5)
BUN SERPL-MCNC: 20 MG/DL (ref 8–22)
CALCIUM ALBUM COR SERPL-MCNC: 9.7 MG/DL (ref 8.5–10.5)
CALCIUM SERPL-MCNC: 10.1 MG/DL (ref 8.5–10.5)
CHLORIDE SERPL-SCNC: 104 MMOL/L (ref 96–112)
CK SERPL-CCNC: 291 U/L (ref 0–154)
CO2 SERPL-SCNC: 26 MMOL/L (ref 20–33)
CREAT SERPL-MCNC: 1.04 MG/DL (ref 0.5–1.4)
CRP SERPL HS-MCNC: <0.3 MG/DL (ref 0–0.75)
GFR SERPLBLD CREATININE-BSD FMLA CKD-EPI: 81 ML/MIN/1.73 M 2
GLOBULIN SER CALC-MCNC: 3 G/DL (ref 1.9–3.5)
GLUCOSE SERPL-MCNC: 109 MG/DL (ref 65–99)
LDH SERPL L TO P-CCNC: 163 U/L (ref 107–266)
POTASSIUM SERPL-SCNC: 4.2 MMOL/L (ref 3.6–5.5)
PROT SERPL-MCNC: 7.5 G/DL (ref 6–8.2)
PSA FREE MFR SERPL: 67 %
PSA FREE SERPL-MCNC: 0.2 NG/ML
PSA SERPL-MCNC: 0.3 NG/ML (ref 0–4)
SODIUM SERPL-SCNC: 139 MMOL/L (ref 135–145)

## 2024-08-10 LAB — ALDOLASE SERPL-CCNC: 5.5 U/L (ref 1.2–7.6)

## 2024-09-13 DIAGNOSIS — M53.3 PAIN OF RIGHT SACROILIAC JOINT: ICD-10-CM

## 2024-09-13 RX ORDER — MELOXICAM 7.5 MG/1
7.5 TABLET ORAL
Qty: 30 TABLET | Refills: 1 | Status: SHIPPED | OUTPATIENT
Start: 2024-09-13

## 2024-12-15 ENCOUNTER — OFFICE VISIT (OUTPATIENT)
Dept: URGENT CARE | Facility: CLINIC | Age: 64
End: 2024-12-15
Payer: COMMERCIAL

## 2024-12-15 ENCOUNTER — APPOINTMENT (OUTPATIENT)
Dept: RADIOLOGY | Facility: IMAGING CENTER | Age: 64
End: 2024-12-15
Payer: COMMERCIAL

## 2024-12-15 VITALS
DIASTOLIC BLOOD PRESSURE: 84 MMHG | BODY MASS INDEX: 23.11 KG/M2 | HEART RATE: 78 BPM | TEMPERATURE: 97.3 F | RESPIRATION RATE: 19 BRPM | WEIGHT: 156 LBS | OXYGEN SATURATION: 96 % | SYSTOLIC BLOOD PRESSURE: 132 MMHG | HEIGHT: 69 IN

## 2024-12-15 DIAGNOSIS — S70.01XA CONTUSION OF RIGHT HIP, INITIAL ENCOUNTER: ICD-10-CM

## 2024-12-15 DIAGNOSIS — M25.551 HIP PAIN, RIGHT: ICD-10-CM

## 2024-12-15 PROBLEM — C01 CANCER OF BASE OF TONGUE (HCC): Status: ACTIVE | Noted: 2020-07-27

## 2024-12-15 PROCEDURE — 99214 OFFICE O/P EST MOD 30 MIN: CPT

## 2024-12-15 PROCEDURE — 73502 X-RAY EXAM HIP UNI 2-3 VIEWS: CPT | Mod: TC,FY,RT

## 2024-12-15 ASSESSMENT — FIBROSIS 4 INDEX: FIB4 SCORE: 1.48

## 2024-12-15 NOTE — PROGRESS NOTES
CHIEF COMPLAINT  Chief Complaint   Patient presents with    Back Pain     Fell yesterday    Hip Pain     R hip      Subjective:   Mauricio Mac is a 64 y.o. male who presents to urgent care with concerns for back pain and right hip pain. Patient report slipping in garage yesterday, landing on his right side. He denies any numbness or tingling. No loss of sensation or strength. He reports use of warm compress and OTC analgesics for discomfort. Patient does report mild improvement in pain today.  f      PAST MEDICAL HISTORY  Patient Active Problem List    Diagnosis Date Noted    Hyperlipidemia 07/04/2024    Thoracic outlet syndrome 07/03/2024    Blurry vision, left eye 07/03/2024    History of tongue cancer 07/03/2024    Other secondary hypertension 07/03/2024    Vaccine reaction 08/23/2022    COVID-19 08/03/2022    Piriformis syndrome, right 12/30/2021    Acute right-sided low back pain without sciatica 11/29/2021    Cancer of base of tongue (HCC) 07/27/2020    White coat syndrome without diagnosis of hypertension 09/17/2018    Back muscle spasm 09/17/2018    Abnormal CXR 09/14/2018    Hyperinflation of lungs 09/14/2018    Gastroesophageal reflux disease without esophagitis 09/14/2018    Hiatal hernia 09/14/2018    Family history of cardiovascular disease 09/14/2018    Family history of melanoma 09/14/2018       SURGICAL HISTORY   has a past surgical history that includes inguinal hernia repair (Right, 2004); shoulder arthroscopy (Right, 1994); phlebectomy (Bilateral, 2009); and other (2006).    ALLERGIES  Allergies   Allergen Reactions    Flu Virus Vaccine Rash     Redness and Swelling, went away after a couple of days    Gabapentin Unspecified     In combination with methadone    Latex Swelling     SENSITIVITY, NOT ALLERGY. SWELLING AROUND MOUTH AFTER DENTAL SURGERY BUT 3 OTHER SURGERIES WITH NO PROBLEM.  SENSITIVITY, NOT ALLERGY. (R/T POWDER THAT USED TO BE IN GLOVES) SWELLING AROUND MOUTH AFTER DENTAL  "SURGERY BUT 3 OTHER SURGERIES WITH NO PROBLEM.      Methadone     Amoxicillin Unspecified     Other reaction(s): Headaches  \"UNCOMFORTABLE\" FEELING, NOT AN ALLERGY  \"UNCOMFORTABLE\" FEELING, NOT AN ALLERGY         CURRENT MEDICATIONS  Home Medications       Reviewed by Pedrito Watson'louis (Medical Assistant) on 12/15/24 at 1135  Med List Status: <None>     Medication Last Dose Status   Creatine Powder Taking Active   levothyroxine (SYNTHROID) 75 MCG Tab Taking Active   MAGNESIUM CITRATE PO Taking Active   meloxicam (MOBIC) 7.5 MG Tab Taking Active   rabeprazole (ACIPHEX) 20 MG tablet Taking Active                    SOCIAL HISTORY  Social History     Tobacco Use    Smoking status: Never    Smokeless tobacco: Never   Vaping Use    Vaping status: Never Used   Substance and Sexual Activity    Alcohol use: Yes     Alcohol/week: 1.2 oz     Types: 1 Glasses of wine, 1 Cans of beer per week     Comment: \"once per month\"    Drug use: No    Sexual activity: Yes     Partners: Female       FAMILY HISTORY  Family History   Problem Relation Age of Onset    Cancer Mother         Melanoma 40s    Other Father 55        primary amyloidosis- not familial type    Cancer Maternal Grandmother         colon cancer- around 80s    Heart Attack Maternal Grandfather         around 60s    Diabetes Paternal Grandmother         lives to 80s         Medications, Allergies, and current problem list reviewed today in Epic.     Objective:     /84   Pulse 78   Temp 36.3 °C (97.3 °F) (Temporal)   Resp 19   Ht 1.753 m (5' 9\")   Wt 70.8 kg (156 lb)   SpO2 96%     Physical Exam  Vitals reviewed.   Constitutional:       General: He is not in acute distress.     Appearance: Normal appearance. He is not ill-appearing or toxic-appearing.   HENT:      Head: Normocephalic.   Cardiovascular:      Rate and Rhythm: Normal rate.      Pulses: Normal pulses.   Pulmonary:      Effort: Pulmonary effort is normal. No respiratory distress.      Breath " sounds: No stridor. No wheezing, rhonchi or rales.   Chest:      Chest wall: No tenderness.   Musculoskeletal:      Cervical back: Normal.      Thoracic back: Normal.      Lumbar back: Normal.        Back:       Left hip: Tenderness present. No deformity or crepitus.      Comments: Back: Full range of flexion, extension, rotation, lateralization.   Right paraspinal tenderness  Negative midline tenderness, bony tenderness, crepitus, deformities, or step-offs.  Negative straight leg raise    Skin:     General: Skin is warm.      Capillary Refill: Capillary refill takes less than 2 seconds.   Neurological:      General: No focal deficit present.      Mental Status: He is alert.   Psychiatric:         Mood and Affect: Mood normal.         RADIOLOGY RESULTS   DX-HIP-COMPLETE - UNILATERAL 2+ RIGHT    Result Date: 12/15/2024  12/15/2024 11:47 AM HISTORY/REASON FOR EXAM:  Pain following trauma. TECHNIQUE/EXAM DESCRIPTION AND NUMBER OF VIEWS:  2 views of the RIGHT hip. COMPARISON: None FINDINGS: BONE MINERALIZATION: Normal. JOINTS: Preserved. No erosions. FRACTURE: None. DISLOCATION: None. SOFT TISSUES: No mass.     No acute osseous abnormality.          Assessment/Plan:     Diagnosis and associated orders:     1. Hip pain, right  DX-HIP-COMPLETE - UNILATERAL 2+ RIGHT      2. Contusion of right hip, initial encounter           Comments/MDM:     Xray in clinic note no acute osseous abnormality. Discussed likely etiology of contusion related to fall. Advised to continue with OTC medication and supportive treatments including ice or warm compress.   Return to clinic if the symptoms fail to improve or resolve.          Differential diagnosis, natural history, supportive care, and indications for immediate follow-up discussed.    Advised the patient to follow-up with the primary care physician for recheck, reevaluation, and consideration of further management.    Please note that this dictation was created using voice recognition  software. I have made a reasonable attempt to correct obvious errors, but I expect that there are errors of grammar and possibly content that I did not discover before finalizing the note.    This note was electronically signed by [email or web address] Stout

## 2024-12-21 ENCOUNTER — OFFICE VISIT (OUTPATIENT)
Dept: URGENT CARE | Facility: CLINIC | Age: 64
End: 2024-12-21
Payer: COMMERCIAL

## 2024-12-21 VITALS
TEMPERATURE: 99.4 F | HEIGHT: 69 IN | RESPIRATION RATE: 19 BRPM | DIASTOLIC BLOOD PRESSURE: 70 MMHG | WEIGHT: 154 LBS | BODY MASS INDEX: 22.81 KG/M2 | OXYGEN SATURATION: 96 % | SYSTOLIC BLOOD PRESSURE: 122 MMHG | HEART RATE: 94 BPM

## 2024-12-21 DIAGNOSIS — R53.83 OTHER FATIGUE: ICD-10-CM

## 2024-12-21 DIAGNOSIS — R05.1 ACUTE COUGH: ICD-10-CM

## 2024-12-21 DIAGNOSIS — R50.9 FEVER, UNSPECIFIED FEVER CAUSE: ICD-10-CM

## 2024-12-21 DIAGNOSIS — R63.0 LOSS OF APPETITE: ICD-10-CM

## 2024-12-21 DIAGNOSIS — B34.9 ACUTE VIRAL SYNDROME: ICD-10-CM

## 2024-12-21 LAB
FLUAV RNA SPEC QL NAA+PROBE: NEGATIVE
FLUBV RNA SPEC QL NAA+PROBE: NEGATIVE
RSV RNA SPEC QL NAA+PROBE: NEGATIVE
SARS-COV-2 RNA RESP QL NAA+PROBE: NEGATIVE

## 2024-12-21 PROCEDURE — 3074F SYST BP LT 130 MM HG: CPT

## 2024-12-21 PROCEDURE — 0241U POCT CEPHEID COV-2, FLU A/B, RSV - PCR: CPT

## 2024-12-21 PROCEDURE — 99213 OFFICE O/P EST LOW 20 MIN: CPT

## 2024-12-21 PROCEDURE — 3078F DIAST BP <80 MM HG: CPT

## 2024-12-21 RX ORDER — OSELTAMIVIR PHOSPHATE 75 MG/1
75 CAPSULE ORAL 2 TIMES DAILY
Qty: 10 CAPSULE | Refills: 0 | Status: CANCELLED | OUTPATIENT
Start: 2024-12-21

## 2024-12-21 ASSESSMENT — ENCOUNTER SYMPTOMS
WHEEZING: 0
FEVER: 1
BLURRED VISION: 0
SPUTUM PRODUCTION: 0
COUGH: 1
ABDOMINAL PAIN: 0
EYE DISCHARGE: 0
BLOOD IN STOOL: 0
PSYCHIATRIC NEGATIVE: 1
VOMITING: 0
MYALGIAS: 0
SINUS PAIN: 0
DIAPHORESIS: 0
SORE THROAT: 0
DIARRHEA: 0
HEADACHES: 0
WEAKNESS: 0
CHILLS: 0
SHORTNESS OF BREATH: 0
NAUSEA: 0
DIZZINESS: 0

## 2024-12-21 ASSESSMENT — FIBROSIS 4 INDEX: FIB4 SCORE: 1.48

## 2024-12-21 NOTE — PROGRESS NOTES
Yes trash or solid food and there subjective:   Mauricio Mac is a 64 y.o. male who presents for Flu Like Symptoms (Fever x 2 nights ago, loss of appetite, dry cough, fatigued. (home covid test was negative)/(Post radiation therapy, dry throat anyway) )      HPI  Patient complains of fever, loss of appetite, dry cough, fatigue for 2 days.  At home COVID test was negative.    Negative: shortness of breath, sputum production, wheezing, dyspnea, rhonchi, hypoxia, respiratory distress, chest pain, body aches, sore throat, muffled voice, drooling, dizziness, fatigue, weakness, sleep disturbance, post nasal drip, sinus pressure, headaches, vision changes, abdominal pain, nausea, vomiting, diarrhea, recent travel       Review of Systems   Constitutional:  Positive for fever and malaise/fatigue. Negative for chills and diaphoresis.   HENT:  Negative for congestion, ear pain, sinus pain and sore throat.    Eyes:  Negative for blurred vision and discharge.   Respiratory:  Positive for cough. Negative for sputum production, shortness of breath and wheezing.    Cardiovascular:  Negative for chest pain.   Gastrointestinal:  Negative for abdominal pain, blood in stool, diarrhea, nausea and vomiting.   Genitourinary: Negative.    Musculoskeletal:  Negative for myalgias.   Skin:  Negative for rash.   Neurological:  Negative for dizziness, weakness and headaches.   Endo/Heme/Allergies: Negative.    Psychiatric/Behavioral: Negative.     All other systems reviewed and are negative.      Medical History:  Past Medical History:   Diagnosis Date    Back spasm     occurs about once a year, flexeril as needed.     Dermatomyositis (HCC)     GERD (gastroesophageal reflux disease)     Hiatal hernia     endoscopy    Rupture of right plantaris tendon        Allergies:  Allergies   Allergen Reactions    Flu Virus Vaccine Rash     Redness and Swelling, went away after a couple of days    Gabapentin Unspecified     In combination with  "methadone    Latex Swelling     SENSITIVITY, NOT ALLERGY. SWELLING AROUND MOUTH AFTER DENTAL SURGERY BUT 3 OTHER SURGERIES WITH NO PROBLEM.  SENSITIVITY, NOT ALLERGY. (R/T POWDER THAT USED TO BE IN GLOVES) SWELLING AROUND MOUTH AFTER DENTAL SURGERY BUT 3 OTHER SURGERIES WITH NO PROBLEM.      Methadone     Amoxicillin Unspecified     Other reaction(s): Headaches  \"UNCOMFORTABLE\" FEELING, NOT AN ALLERGY  \"UNCOMFORTABLE\" FEELING, NOT AN ALLERGY         Social history, surgical history, medications, and current problem list reviewed today in Epic.       Objective:     /70 (BP Location: Left arm, Patient Position: Sitting, BP Cuff Size: Adult)   Pulse 94   Temp 37.4 °C (99.4 °F) (Temporal)   Resp 19   Ht 1.753 m (5' 9\")   Wt 69.9 kg (154 lb)   SpO2 96%     Physical Exam  Vitals reviewed.   Constitutional:       General: He is not in acute distress.     Appearance: Normal appearance. He is not ill-appearing, toxic-appearing or diaphoretic.   HENT:      Head: Normocephalic.      Jaw: There is normal jaw occlusion.      Right Ear: Tympanic membrane, ear canal and external ear normal.      Left Ear: Tympanic membrane, ear canal and external ear normal.      Nose: Rhinorrhea present. No congestion.      Right Sinus: No maxillary sinus tenderness or frontal sinus tenderness.      Left Sinus: No maxillary sinus tenderness or frontal sinus tenderness.      Mouth/Throat:      Lips: Pink.      Mouth: Mucous membranes are moist.      Tongue: Tongue does not deviate from midline.      Pharynx: Oropharynx is clear. Uvula midline. No oropharyngeal exudate, posterior oropharyngeal erythema or uvula swelling.   Eyes:      Extraocular Movements: Extraocular movements intact.      Conjunctiva/sclera: Conjunctivae normal.      Pupils: Pupils are equal, round, and reactive to light.   Cardiovascular:      Rate and Rhythm: Normal rate and regular rhythm.      Pulses: Normal pulses.      Heart sounds: Normal heart sounds. "   Pulmonary:      Effort: Pulmonary effort is normal.      Breath sounds: Normal breath sounds.   Abdominal:      General: Abdomen is flat.      Palpations: Abdomen is soft.      Tenderness: There is no abdominal tenderness.   Musculoskeletal:         General: Normal range of motion.      Cervical back: Normal range of motion and neck supple.   Lymphadenopathy:      Cervical: No cervical adenopathy.   Skin:     General: Skin is warm.      Capillary Refill: Capillary refill takes less than 2 seconds.   Neurological:      General: No focal deficit present.      Mental Status: He is alert and oriented to person, place, and time.   Psychiatric:         Mood and Affect: Mood normal.         Behavior: Behavior normal.         Assessment/Plan:       Diagnosis and associated orders:     1. Acute cough  - POCT CEPHEID COV-2, FLU A/B, RSV - PCR    2. Fever, unspecified fever cause  - POCT CEPHEID COV-2, FLU A/B, RSV - PCR    3. Loss of appetite  - POCT CEPHEID COV-2, FLU A/B, RSV - PCR    4. Other fatigue  - POCT CEPHEID COV-2, FLU A/B, RSV - PCR     Comments/MDM:   I personally reviewed prior external notes and test results pertinent to today's visit as well as additional imaging and testing completed in clinic today.     Very pleasant 64-year-old afebrile, hemodynamically stable, generally well-appearing male, presenting with complaints of fever, loss of appetite, dry cough, fatigue for 2 days.  Normal pulmonary exam, no concern for pneumonia or bronchitis.  Normal ENT exam outside of rhinorrhea, no concern for acute otitis media, strep pharyngitis, or bacterial sinus infection.  Patient agreeable to in clinic viral testing.    In clinic viral testing was negative.    Patient encouraged to increase fluids and rest, cool-mist humidifier, saline nasal rinse with distilled water, cough/throat drops, salt water gargles, tylenol or ibuprofen for fever and/or bodyaches.  At this time I do not believe antibiotics would improve  patient's symptoms.  Patient encouraged to follow-up with the clinic in 48 hours for re-evaluation as needed.  Patient given strict instructions to follow up with the emergency room if they develop worsening symptoms.  Patient verbalized understanding.      Patient is clinically stable at today's acute urgent care visit. Vital signs are normal and reassuring.  No acute distress noted. Appropriate for outpatient management at this time. No red flag warnings noted.  Patient given strict instructions to follow up with emergency room if they develop any red flag warnings which were discussed in depth.  They verbalized understanding.      Differential diagnosis, natural history, supportive care, and indications for immediate follow-up discussed. All questions answered. Patient agrees with the plan of care. Advised the patient to follow-up with the primary care physician for recheck, reevaluation, and consideration of further management or the emergency room for worsening symptoms.      Please note that this dictation was created using voice recognition software. I have made every reasonable attempt to correct obvious errors, but I expect that there are errors of grammar and possibly content that I did not discover before finalizing the note.

## 2024-12-21 NOTE — PATIENT INSTRUCTIONS
Your in clinic viral testing was negative.  Most likely community-acquired viral syndrome.  Education:  -A cough can persist for up to 6 weeks.  -Increase fluids and rest.  -Cool-mist humidifier for nighttime use.   -Practice good hand hygiene.  -Zinc 25 mg has been shown to be effective in reducing the duration of cold symptoms.   -You may use either acetaminophen or ibuprofen over-the-counter every 6-8 hours for pain or fever if that is not contraindicated with your body.    -Saline Nasal Spray and Flonase may be used for congestion. Nasal saline in the nose helps to help break up nasal secretions, and is beneficial for nasal symptoms and throat pain.  -Saline Nasal Rinse with a Neti pot or Brett med rinse can be used multiple times a day. Be sure to use distilled water or boil tap water for 10 mins. Add a saline packet. Be sure the water is not too hot (around your body temp of 98 degrees)  -Decongestants are not recommended as they can have a rebound congestion effect along with many side effects (especially if you have high blood pressure).   -Chloraseptic lozenge, warm tea with honey or  throat spray to help with discomfort.  -Salt water gargles for sore throat several times a day.

## 2025-02-03 ENCOUNTER — HOSPITAL ENCOUNTER (OUTPATIENT)
Dept: LAB | Facility: MEDICAL CENTER | Age: 65
End: 2025-02-03
Attending: STUDENT IN AN ORGANIZED HEALTH CARE EDUCATION/TRAINING PROGRAM
Payer: OTHER MISCELLANEOUS

## 2025-02-03 LAB
ALBUMIN SERPL BCP-MCNC: 4.3 G/DL (ref 3.2–4.9)
ALBUMIN/GLOB SERPL: 1.7 G/DL
ALP SERPL-CCNC: 103 U/L (ref 30–99)
ALT SERPL-CCNC: 22 U/L (ref 2–50)
ANION GAP SERPL CALC-SCNC: 10 MMOL/L (ref 7–16)
AST SERPL-CCNC: 24 U/L (ref 12–45)
BASOPHILS # BLD AUTO: 0.3 % (ref 0–1.8)
BASOPHILS # BLD: 0.02 K/UL (ref 0–0.12)
BILIRUB SERPL-MCNC: 0.3 MG/DL (ref 0.1–1.5)
BUN SERPL-MCNC: 25 MG/DL (ref 8–22)
CALCIUM ALBUM COR SERPL-MCNC: 9.3 MG/DL (ref 8.5–10.5)
CALCIUM SERPL-MCNC: 9.5 MG/DL (ref 8.5–10.5)
CHLORIDE SERPL-SCNC: 104 MMOL/L (ref 96–112)
CK SERPL-CCNC: 131 U/L (ref 0–154)
CO2 SERPL-SCNC: 27 MMOL/L (ref 20–33)
CREAT SERPL-MCNC: 1.17 MG/DL (ref 0.5–1.4)
CRP SERPL HS-MCNC: <0.3 MG/DL (ref 0–0.75)
EOSINOPHIL # BLD AUTO: 0.22 K/UL (ref 0–0.51)
EOSINOPHIL NFR BLD: 3.3 % (ref 0–6.9)
ERYTHROCYTE [DISTWIDTH] IN BLOOD BY AUTOMATED COUNT: 43.6 FL (ref 35.9–50)
ERYTHROCYTE [SEDIMENTATION RATE] IN BLOOD BY WESTERGREN METHOD: 7 MM/HOUR (ref 0–20)
GFR SERPLBLD CREATININE-BSD FMLA CKD-EPI: 70 ML/MIN/1.73 M 2
GLOBULIN SER CALC-MCNC: 2.5 G/DL (ref 1.9–3.5)
GLUCOSE SERPL-MCNC: 103 MG/DL (ref 65–99)
HCT VFR BLD AUTO: 40.7 % (ref 42–52)
HGB BLD-MCNC: 13 G/DL (ref 14–18)
IMM GRANULOCYTES # BLD AUTO: 0.02 K/UL (ref 0–0.11)
IMM GRANULOCYTES NFR BLD AUTO: 0.3 % (ref 0–0.9)
LDH SERPL L TO P-CCNC: 146 U/L (ref 107–266)
LYMPHOCYTES # BLD AUTO: 1.66 K/UL (ref 1–4.8)
LYMPHOCYTES NFR BLD: 24.6 % (ref 22–41)
MCH RBC QN AUTO: 27 PG (ref 27–33)
MCHC RBC AUTO-ENTMCNC: 31.9 G/DL (ref 32.3–36.5)
MCV RBC AUTO: 84.6 FL (ref 81.4–97.8)
MONOCYTES # BLD AUTO: 0.66 K/UL (ref 0–0.85)
MONOCYTES NFR BLD AUTO: 9.8 % (ref 0–13.4)
NEUTROPHILS # BLD AUTO: 4.18 K/UL (ref 1.82–7.42)
NEUTROPHILS NFR BLD: 61.7 % (ref 44–72)
NRBC # BLD AUTO: 0 K/UL
NRBC BLD-RTO: 0 /100 WBC (ref 0–0.2)
PLATELET # BLD AUTO: 278 K/UL (ref 164–446)
PMV BLD AUTO: 9.1 FL (ref 9–12.9)
POTASSIUM SERPL-SCNC: 4.4 MMOL/L (ref 3.6–5.5)
PROT SERPL-MCNC: 6.8 G/DL (ref 6–8.2)
RBC # BLD AUTO: 4.81 M/UL (ref 4.7–6.1)
SODIUM SERPL-SCNC: 141 MMOL/L (ref 135–145)
WBC # BLD AUTO: 6.8 K/UL (ref 4.8–10.8)

## 2025-02-03 PROCEDURE — 85025 COMPLETE CBC W/AUTO DIFF WBC: CPT

## 2025-02-03 PROCEDURE — 82550 ASSAY OF CK (CPK): CPT

## 2025-02-03 PROCEDURE — 36415 COLL VENOUS BLD VENIPUNCTURE: CPT

## 2025-02-03 PROCEDURE — 82085 ASSAY OF ALDOLASE: CPT

## 2025-02-03 PROCEDURE — 83615 LACTATE (LD) (LDH) ENZYME: CPT

## 2025-02-03 PROCEDURE — 86140 C-REACTIVE PROTEIN: CPT

## 2025-02-03 PROCEDURE — 80053 COMPREHEN METABOLIC PANEL: CPT

## 2025-02-03 PROCEDURE — 85652 RBC SED RATE AUTOMATED: CPT

## 2025-02-05 LAB — ALDOLASE SERPL-CCNC: 4.9 U/L (ref 1.2–7.6)

## 2025-03-01 ENCOUNTER — OFFICE VISIT (OUTPATIENT)
Dept: URGENT CARE | Facility: CLINIC | Age: 65
End: 2025-03-01
Payer: COMMERCIAL

## 2025-03-01 VITALS
RESPIRATION RATE: 16 BRPM | BODY MASS INDEX: 22.51 KG/M2 | OXYGEN SATURATION: 96 % | DIASTOLIC BLOOD PRESSURE: 70 MMHG | HEIGHT: 69 IN | SYSTOLIC BLOOD PRESSURE: 132 MMHG | WEIGHT: 152 LBS | HEART RATE: 81 BPM | TEMPERATURE: 98.1 F

## 2025-03-01 DIAGNOSIS — J22 LOWER RESPIRATORY INFECTION: ICD-10-CM

## 2025-03-01 PROCEDURE — 99213 OFFICE O/P EST LOW 20 MIN: CPT | Performed by: FAMILY MEDICINE

## 2025-03-01 PROCEDURE — 3075F SYST BP GE 130 - 139MM HG: CPT | Performed by: FAMILY MEDICINE

## 2025-03-01 PROCEDURE — 3078F DIAST BP <80 MM HG: CPT | Performed by: FAMILY MEDICINE

## 2025-03-01 RX ORDER — DOXYCYCLINE HYCLATE 100 MG
100 TABLET ORAL 2 TIMES DAILY
Qty: 14 TABLET | Refills: 0 | Status: SHIPPED | OUTPATIENT
Start: 2025-03-01 | End: 2025-03-08

## 2025-03-01 ASSESSMENT — ENCOUNTER SYMPTOMS
SORE THROAT: 1
FEVER: 0
COUGH: 1

## 2025-03-01 ASSESSMENT — FIBROSIS 4 INDEX: FIB4 SCORE: 1.16

## 2025-03-01 NOTE — PROGRESS NOTES
"Subjective:     Mauricio Mac is a 64 y.o. male who presents for URI (Nasal congestion, post nasal drip, sore throat, cough x8 days)    HPI  Pt presents for evaluation of an acute problem  Pt with an illness for the past 8 days   Started with sore throat and rhinorrhea   Cough is mild to moderate and productive   Continues to have sore throat   Feeling fatigued   Taking OTC medications and helping some     Review of Systems   Constitutional:  Negative for fever.   HENT:  Positive for congestion and sore throat.    Respiratory:  Positive for cough.        PMH:  has a past medical history of Back spasm, Dermatomyositis (HCC), GERD (gastroesophageal reflux disease), Hiatal hernia, and Rupture of right plantaris tendon.  MEDS:   Current Outpatient Medications:     doxycycline (VIBRAMYCIN) 100 MG Tab, Take 1 Tablet by mouth 2 times a day for 7 days., Disp: 14 Tablet, Rfl: 0    meloxicam (MOBIC) 7.5 MG Tab, Take 1 Tablet by mouth 1 time a day as needed for Moderate Pain., Disp: 30 Tablet, Rfl: 1    MAGNESIUM CITRATE PO, Take 1 Tablet by mouth every day., Disp: , Rfl:     Creatine Powder, Take 1 Dose by mouth every day., Disp: , Rfl:     levothyroxine (SYNTHROID) 75 MCG Tab, Take 75 mcg by mouth every morning on an empty stomach., Disp: , Rfl:     rabeprazole (ACIPHEX) 20 MG tablet, Take 20 mg by mouth every 48 hours., Disp: , Rfl:   ALLERGIES:   Allergies   Allergen Reactions    Flu Virus Vaccine Rash     Redness and Swelling, went away after a couple of days    Gabapentin Unspecified     In combination with methadone    Latex Swelling     SENSITIVITY, NOT ALLERGY. SWELLING AROUND MOUTH AFTER DENTAL SURGERY BUT 3 OTHER SURGERIES WITH NO PROBLEM.  SENSITIVITY, NOT ALLERGY. (R/T POWDER THAT USED TO BE IN GLOVES) SWELLING AROUND MOUTH AFTER DENTAL SURGERY BUT 3 OTHER SURGERIES WITH NO PROBLEM.      Methadone     Amoxicillin Unspecified     Other reaction(s): Headaches  \"UNCOMFORTABLE\" FEELING, NOT AN " "ALLERGY  \"UNCOMFORTABLE\" FEELING, NOT AN ALLERGY       SURGHX:   Past Surgical History:   Procedure Laterality Date    PHLEBECTOMY Bilateral 2009    OTHER  2006    L5-S1 surgery for herniated disc    INGUINAL HERNIA REPAIR Right 2004    SHOULDER ARTHROSCOPY Right 1994     SOCHX:  reports that he has never smoked. He has never used smokeless tobacco. He reports current alcohol use of about 1.2 oz of alcohol per week. He reports that he does not use drugs.     Objective:   /70 (BP Location: Left arm, Patient Position: Sitting, BP Cuff Size: Large adult)   Pulse 81   Temp 36.7 °C (98.1 °F) (Temporal)   Resp 16   Ht 1.753 m (5' 9\")   Wt 68.9 kg (152 lb)   SpO2 96%   BMI 22.45 kg/m²     Physical Exam  Constitutional:       General: He is not in acute distress.     Appearance: He is well-developed. He is not diaphoretic.   HENT:      Head: Normocephalic and atraumatic.      Right Ear: Tympanic membrane, ear canal and external ear normal.      Left Ear: Tympanic membrane, ear canal and external ear normal.      Nose: Congestion present.      Mouth/Throat:      Mouth: Mucous membranes are moist.      Pharynx: Oropharynx is clear. No oropharyngeal exudate or posterior oropharyngeal erythema.   Neck:      Trachea: No tracheal deviation.   Cardiovascular:      Rate and Rhythm: Normal rate and regular rhythm.      Heart sounds: Normal heart sounds.   Pulmonary:      Effort: Pulmonary effort is normal. No respiratory distress.      Breath sounds: Normal breath sounds. No wheezing or rales.   Musculoskeletal:         General: Normal range of motion.      Cervical back: Normal range of motion and neck supple. No tenderness.   Skin:     General: Skin is warm and dry.      Findings: No rash.   Neurological:      Mental Status: He is alert.         Assessment/Plan:   Assessment    1. Lower respiratory infection  - doxycycline (VIBRAMYCIN) 100 MG Tab; Take 1 Tablet by mouth 2 times a day for 7 days.  Dispense: 14 Tablet; " Refill: 0    Patient with respiratory infection for the last 8 days.  Overall appears well, clear lungs, and no sign of secondary infection.  Reviewed supportive care measures expected course of recovery.  Did give contingent antibiotics if developing new fever or other prediscussed criteria.  All questions answered and will follow-up in the urgent care as needed.

## 2025-05-20 ENCOUNTER — HOSPITAL ENCOUNTER (OUTPATIENT)
Dept: LAB | Facility: MEDICAL CENTER | Age: 65
End: 2025-05-20
Attending: STUDENT IN AN ORGANIZED HEALTH CARE EDUCATION/TRAINING PROGRAM
Payer: OTHER MISCELLANEOUS

## 2025-05-20 LAB
ALBUMIN SERPL BCP-MCNC: 4.6 G/DL (ref 3.2–4.9)
ALBUMIN/GLOB SERPL: 1.6 G/DL
ALP SERPL-CCNC: 95 U/L (ref 30–99)
ALT SERPL-CCNC: 26 U/L (ref 2–50)
ANION GAP SERPL CALC-SCNC: 10 MMOL/L (ref 7–16)
AST SERPL-CCNC: 21 U/L (ref 12–45)
BASOPHILS # BLD AUTO: 0.3 % (ref 0–1.8)
BASOPHILS # BLD: 0.03 K/UL (ref 0–0.12)
BILIRUB SERPL-MCNC: <0.2 MG/DL (ref 0.1–1.5)
BUN SERPL-MCNC: 15 MG/DL (ref 8–22)
CALCIUM ALBUM COR SERPL-MCNC: 9.2 MG/DL (ref 8.5–10.5)
CALCIUM SERPL-MCNC: 9.7 MG/DL (ref 8.5–10.5)
CHLORIDE SERPL-SCNC: 101 MMOL/L (ref 96–112)
CK SERPL-CCNC: 162 U/L (ref 0–154)
CO2 SERPL-SCNC: 26 MMOL/L (ref 20–33)
CREAT SERPL-MCNC: 1.04 MG/DL (ref 0.5–1.4)
CRP SERPL HS-MCNC: <0.3 MG/DL (ref 0–0.75)
EOSINOPHIL # BLD AUTO: 0.28 K/UL (ref 0–0.51)
EOSINOPHIL NFR BLD: 3 % (ref 0–6.9)
ERYTHROCYTE [DISTWIDTH] IN BLOOD BY AUTOMATED COUNT: 43.5 FL (ref 35.9–50)
ERYTHROCYTE [SEDIMENTATION RATE] IN BLOOD BY WESTERGREN METHOD: 7 MM/HOUR (ref 0–20)
GFR SERPLBLD CREATININE-BSD FMLA CKD-EPI: 80 ML/MIN/1.73 M 2
GLOBULIN SER CALC-MCNC: 2.8 G/DL (ref 1.9–3.5)
GLUCOSE SERPL-MCNC: 94 MG/DL (ref 65–99)
HCT VFR BLD AUTO: 43.8 % (ref 42–52)
HGB BLD-MCNC: 13.9 G/DL (ref 14–18)
IMM GRANULOCYTES # BLD AUTO: 0.03 K/UL (ref 0–0.11)
IMM GRANULOCYTES NFR BLD AUTO: 0.3 % (ref 0–0.9)
LDH SERPL L TO P-CCNC: 153 U/L (ref 107–266)
LYMPHOCYTES # BLD AUTO: 1.94 K/UL (ref 1–4.8)
LYMPHOCYTES NFR BLD: 20.5 % (ref 22–41)
MCH RBC QN AUTO: 26.9 PG (ref 27–33)
MCHC RBC AUTO-ENTMCNC: 31.7 G/DL (ref 32.3–36.5)
MCV RBC AUTO: 84.7 FL (ref 81.4–97.8)
MONOCYTES # BLD AUTO: 0.76 K/UL (ref 0–0.85)
MONOCYTES NFR BLD AUTO: 8 % (ref 0–13.4)
NEUTROPHILS # BLD AUTO: 6.43 K/UL (ref 1.82–7.42)
NEUTROPHILS NFR BLD: 67.9 % (ref 44–72)
NRBC # BLD AUTO: 0 K/UL
NRBC BLD-RTO: 0 /100 WBC (ref 0–0.2)
PLATELET # BLD AUTO: 238 K/UL (ref 164–446)
PMV BLD AUTO: 11.5 FL (ref 9–12.9)
POTASSIUM SERPL-SCNC: 4.6 MMOL/L (ref 3.6–5.5)
PROT SERPL-MCNC: 7.4 G/DL (ref 6–8.2)
RBC # BLD AUTO: 5.17 M/UL (ref 4.7–6.1)
SODIUM SERPL-SCNC: 137 MMOL/L (ref 135–145)
WBC # BLD AUTO: 9.5 K/UL (ref 4.8–10.8)

## 2025-05-20 PROCEDURE — 86140 C-REACTIVE PROTEIN: CPT

## 2025-05-20 PROCEDURE — 80053 COMPREHEN METABOLIC PANEL: CPT

## 2025-05-20 PROCEDURE — 85652 RBC SED RATE AUTOMATED: CPT

## 2025-05-20 PROCEDURE — 83615 LACTATE (LD) (LDH) ENZYME: CPT

## 2025-05-20 PROCEDURE — 36415 COLL VENOUS BLD VENIPUNCTURE: CPT

## 2025-05-20 PROCEDURE — 82085 ASSAY OF ALDOLASE: CPT

## 2025-05-20 PROCEDURE — 82550 ASSAY OF CK (CPK): CPT

## 2025-05-20 PROCEDURE — 85025 COMPLETE CBC W/AUTO DIFF WBC: CPT

## 2025-05-22 LAB — ALDOLASE SERPL-CCNC: 5.6 U/L (ref 1.2–7.6)

## 2025-06-10 DIAGNOSIS — C01 CANCER OF BASE OF TONGUE (HCC): ICD-10-CM

## 2025-06-10 DIAGNOSIS — G54.0 THORACIC OUTLET SYNDROME: ICD-10-CM

## 2025-06-10 DIAGNOSIS — T50.Z95D ADVERSE EFFECT OF VACCINE, SUBSEQUENT ENCOUNTER: ICD-10-CM

## 2025-06-10 DIAGNOSIS — Z85.810 HISTORY OF TONGUE CANCER: ICD-10-CM

## 2025-06-10 DIAGNOSIS — E78.2 MODERATE MIXED HYPERLIPIDEMIA NOT REQUIRING STATIN THERAPY: Primary | ICD-10-CM

## 2025-06-19 ENCOUNTER — HOSPITAL ENCOUNTER (OUTPATIENT)
Dept: LAB | Facility: MEDICAL CENTER | Age: 65
End: 2025-06-19
Attending: FAMILY MEDICINE
Payer: COMMERCIAL

## 2025-06-19 DIAGNOSIS — T50.Z95D ADVERSE EFFECT OF VACCINE, SUBSEQUENT ENCOUNTER: ICD-10-CM

## 2025-06-19 DIAGNOSIS — Z85.810 HISTORY OF TONGUE CANCER: ICD-10-CM

## 2025-06-19 DIAGNOSIS — G54.0 THORACIC OUTLET SYNDROME: ICD-10-CM

## 2025-06-19 DIAGNOSIS — E78.2 MODERATE MIXED HYPERLIPIDEMIA NOT REQUIRING STATIN THERAPY: ICD-10-CM

## 2025-06-19 DIAGNOSIS — C01 CANCER OF BASE OF TONGUE (HCC): ICD-10-CM

## 2025-06-19 LAB
ALBUMIN SERPL BCP-MCNC: 4.1 G/DL (ref 3.2–4.9)
ALBUMIN/GLOB SERPL: 1.6 G/DL
ALP SERPL-CCNC: 88 U/L (ref 30–99)
ALT SERPL-CCNC: 19 U/L (ref 2–50)
ANION GAP SERPL CALC-SCNC: 10 MMOL/L (ref 7–16)
AST SERPL-CCNC: 28 U/L (ref 12–45)
BILIRUB SERPL-MCNC: 0.4 MG/DL (ref 0.1–1.5)
BUN SERPL-MCNC: 18 MG/DL (ref 8–22)
CALCIUM ALBUM COR SERPL-MCNC: 9.2 MG/DL (ref 8.5–10.5)
CALCIUM SERPL-MCNC: 9.3 MG/DL (ref 8.5–10.5)
CHLORIDE SERPL-SCNC: 103 MMOL/L (ref 96–112)
CHOLEST SERPL-MCNC: 212 MG/DL (ref 100–199)
CO2 SERPL-SCNC: 25 MMOL/L (ref 20–33)
CREAT SERPL-MCNC: 1.02 MG/DL (ref 0.5–1.4)
ERYTHROCYTE [DISTWIDTH] IN BLOOD BY AUTOMATED COUNT: 44.3 FL (ref 35.9–50)
EST. AVERAGE GLUCOSE BLD GHB EST-MCNC: 123 MG/DL
GFR SERPLBLD CREATININE-BSD FMLA CKD-EPI: 82 ML/MIN/1.73 M 2
GLOBULIN SER CALC-MCNC: 2.6 G/DL (ref 1.9–3.5)
GLUCOSE SERPL-MCNC: 102 MG/DL (ref 65–99)
HBA1C MFR BLD: 5.9 % (ref 4–5.6)
HCT VFR BLD AUTO: 39.8 % (ref 42–52)
HDLC SERPL-MCNC: 49 MG/DL
HGB BLD-MCNC: 12.8 G/DL (ref 14–18)
LDLC SERPL CALC-MCNC: 127 MG/DL
MCH RBC QN AUTO: 27.1 PG (ref 27–33)
MCHC RBC AUTO-ENTMCNC: 32.2 G/DL (ref 32.3–36.5)
MCV RBC AUTO: 84.3 FL (ref 81.4–97.8)
PLATELET # BLD AUTO: 258 K/UL (ref 164–446)
PMV BLD AUTO: 10.4 FL (ref 9–12.9)
POTASSIUM SERPL-SCNC: 4.3 MMOL/L (ref 3.6–5.5)
PROT SERPL-MCNC: 6.7 G/DL (ref 6–8.2)
PSA SERPL DL<=0.01 NG/ML-MCNC: 0.6 NG/ML (ref 0–4)
RBC # BLD AUTO: 4.72 M/UL (ref 4.7–6.1)
SODIUM SERPL-SCNC: 138 MMOL/L (ref 135–145)
TRIGL SERPL-MCNC: 182 MG/DL (ref 0–149)
WBC # BLD AUTO: 8 K/UL (ref 4.8–10.8)

## 2025-06-19 PROCEDURE — 80061 LIPID PANEL: CPT

## 2025-06-19 PROCEDURE — 36415 COLL VENOUS BLD VENIPUNCTURE: CPT

## 2025-06-19 PROCEDURE — 85027 COMPLETE CBC AUTOMATED: CPT

## 2025-06-19 PROCEDURE — 83036 HEMOGLOBIN GLYCOSYLATED A1C: CPT

## 2025-06-19 PROCEDURE — 80053 COMPREHEN METABOLIC PANEL: CPT

## 2025-06-19 PROCEDURE — 84153 ASSAY OF PSA TOTAL: CPT

## 2025-07-02 ENCOUNTER — APPOINTMENT (OUTPATIENT)
Dept: MEDICAL GROUP | Facility: OTHER | Age: 65
End: 2025-07-02
Payer: COMMERCIAL

## 2025-07-02 VITALS
SYSTOLIC BLOOD PRESSURE: 160 MMHG | HEART RATE: 67 BPM | HEIGHT: 69 IN | BODY MASS INDEX: 22.81 KG/M2 | DIASTOLIC BLOOD PRESSURE: 74 MMHG | OXYGEN SATURATION: 95 % | WEIGHT: 154 LBS | TEMPERATURE: 97.8 F

## 2025-07-02 DIAGNOSIS — R03.0 WHITE COAT SYNDROME WITHOUT DIAGNOSIS OF HYPERTENSION: ICD-10-CM

## 2025-07-02 DIAGNOSIS — E78.2 MIXED HYPERLIPIDEMIA: ICD-10-CM

## 2025-07-02 DIAGNOSIS — K21.9 GASTROESOPHAGEAL REFLUX DISEASE WITHOUT ESOPHAGITIS: Primary | ICD-10-CM

## 2025-07-02 PROCEDURE — 3078F DIAST BP <80 MM HG: CPT | Performed by: FAMILY MEDICINE

## 2025-07-02 PROCEDURE — 99214 OFFICE O/P EST MOD 30 MIN: CPT | Performed by: FAMILY MEDICINE

## 2025-07-02 PROCEDURE — 3077F SYST BP >= 140 MM HG: CPT | Performed by: FAMILY MEDICINE

## 2025-07-02 RX ORDER — RABEPRAZOLE SODIUM 20 MG/1
20 TABLET, DELAYED RELEASE ORAL
Qty: 90 TABLET | Refills: 1 | Status: SHIPPED | OUTPATIENT
Start: 2025-07-02

## 2025-07-02 ASSESSMENT — FIBROSIS 4 INDEX: FIB4 SCORE: 1.57

## 2025-07-02 ASSESSMENT — PATIENT HEALTH QUESTIONNAIRE - PHQ9: CLINICAL INTERPRETATION OF PHQ2 SCORE: 0

## 2025-07-07 ASSESSMENT — ENCOUNTER SYMPTOMS
RESPIRATORY NEGATIVE: 1
CONSTITUTIONAL NEGATIVE: 1
EYES NEGATIVE: 1
NEUROLOGICAL NEGATIVE: 1
GASTROINTESTINAL NEGATIVE: 1
CARDIOVASCULAR NEGATIVE: 1
PSYCHIATRIC NEGATIVE: 1

## 2025-07-07 NOTE — ASSESSMENT & PLAN NOTE
I think I would like to recheck his cholesterol panel in 3 months and see what it looks like at that point if he is still continuing to climb we will consider adding a statin to his regimen.

## 2025-07-07 NOTE — ASSESSMENT & PLAN NOTE
Patient with a long-term history of gastroesophageal reflux disease we will go ahead and write for his Aciphex today.  He is scheduled to follow-up with gastroenterology at [place] soon to get his swallowing mechanics improved.

## 2025-07-07 NOTE — ASSESSMENT & PLAN NOTE
Patient with increased blood pressure today we will grab a couple of repeat blood pressures in the next month or 2 and if need be we can place him back on medications however I do understand that he has significant  whitecoat hypertension

## 2025-07-07 NOTE — PROGRESS NOTES
Subjective:   CC:   Chief Complaint   Patient presents with    Annual Exam     No concerns       HPI: Mauricio is a 64 y.o. male who presents today for the following problems:    Problem   Hyperlipidemia    Will go ahead and also look at Mauricio's lipid panel this visit.  He states that he has not been working out like he normally does and has maybe like his diet slip a little bit.  Denies any chest pain or shortness of breath.  However in review of his lipid panel it does appear that he has slightly increased in both his LDL and total cholesterol and his triglycerides.     White Coat Syndrome Without Diagnosis of Hypertension    Mauricio's blood pressure was pretty elevated today the 160s over 74.  He is currently not on any medications for blood pressure however he does have a history of having not so great encounters with healthcare professionals.  He assures me that at home his blood pressure is much better in the 130s 120s.  Said that he watches it.  And that it is always higher in the doctor's office.     Gastroesophageal Reflux Disease Without Esophagitis    Mauricio follows up with me today for gastroesophageal reflux disease.  States that he is actually having little bit of an issue with swallowing.  He is being worked up at [facility] and it sounds like he has a little bit of extra tissue we does get in the way of his swallowing mechanics and he will need to actually have that ablated.  In the meantime however he does state that he has pretty good control over his GERD when he takes his Aciphex.  He does however need a refill on that and is asking for that for me today.  He will follow-up with with gastro at [facility]         Current Medications[1]    Social History[2]    Review of Systems   Constitutional: Negative.    HENT: Negative.     Eyes: Negative.    Respiratory: Negative.     Cardiovascular: Negative.    Gastrointestinal: Negative.    Skin: Negative.    Neurological: Negative.    Psychiatric/Behavioral: Negative.    "        Objective:     Vitals:    07/02/25 0940   BP: (!) 160/74   BP Location: Right arm   Patient Position: Sitting   Pulse: 67   Temp: 36.6 °C (97.8 °F)   SpO2: 95%   Weight: 69.9 kg (154 lb)   Height: 1.753 m (5' 9\")     Body mass index is 22.74 kg/m².     Physical Exam  Vitals reviewed.   Constitutional:       Appearance: Normal appearance.   HENT:      Head: Normocephalic and atraumatic.   Eyes:      Extraocular Movements: Extraocular movements intact.      Pupils: Pupils are equal, round, and reactive to light.   Cardiovascular:      Rate and Rhythm: Normal rate and regular rhythm.      Pulses: Normal pulses.      Heart sounds: Normal heart sounds. No murmur heard.     No friction rub. No gallop.   Pulmonary:      Effort: Pulmonary effort is normal. No respiratory distress.      Breath sounds: Normal breath sounds. No stridor. No wheezing, rhonchi or rales.   Skin:     General: Skin is warm.   Neurological:      General: No focal deficit present.      Mental Status: He is alert. Mental status is at baseline.          Assessment & Plan:   Gastroesophageal reflux disease without esophagitis  Patient with a long-term history of gastroesophageal reflux disease we will go ahead and write for his Aciphex today.  He is scheduled to follow-up with gastroenterology at [place] soon to get his swallowing mechanics improved.    White coat syndrome without diagnosis of hypertension  Patient with increased blood pressure today we will grab a couple of repeat blood pressures in the next month or 2 and if need be we can place him back on medications however I do understand that he has significant  whitecoat hypertension    Hyperlipidemia  I think I would like to recheck his cholesterol panel in 3 months and see what it looks like at that point if he is still continuing to climb we will consider adding a statin to his regimen.    Followup: Return in about 2 months (around 9/2/2025), or if symptoms worsen or fail to " "improve.    Jaron Cleaning M.D.    Please note that this dictation was created using voice recognition software. I have made every reasonable attempt to correct obvious errors, but I expect that there are errors of grammar and possibly content that I did not discover before finalizing the note.       [1]   Current Outpatient Medications   Medication Sig Dispense Refill    rabeprazole (ACIPHEX) 20 MG tablet Take 1 Tablet by mouth every 48 hours. 90 Tablet 1    meloxicam (MOBIC) 7.5 MG Tab Take 1 Tablet by mouth 1 time a day as needed for Moderate Pain. 30 Tablet 1    Creatine Powder Take 1 Dose by mouth every day.      levothyroxine (SYNTHROID) 75 MCG Tab Take 75 mcg by mouth every morning on an empty stomach.      MAGNESIUM CITRATE PO Take 1 Tablet by mouth every day.       No current facility-administered medications for this visit.   [2]   Social History  Tobacco Use    Smoking status: Never    Smokeless tobacco: Never   Vaping Use    Vaping status: Never Used   Substance Use Topics    Alcohol use: Not Currently     Alcohol/week: 1.2 oz     Types: 1 Glasses of wine, 1 Cans of beer per week     Comment: \"once per month\"    Drug use: No     "